# Patient Record
Sex: MALE | Race: BLACK OR AFRICAN AMERICAN | NOT HISPANIC OR LATINO | URBAN - METROPOLITAN AREA
[De-identification: names, ages, dates, MRNs, and addresses within clinical notes are randomized per-mention and may not be internally consistent; named-entity substitution may affect disease eponyms.]

---

## 2024-04-16 ENCOUNTER — INPATIENT (INPATIENT)
Facility: HOSPITAL | Age: 84
LOS: 9 days | Discharge: HOME HEALTH | DRG: 539 | End: 2024-04-26
Attending: INTERNAL MEDICINE | Admitting: HOSPITALIST
Payer: MEDICARE

## 2024-04-16 VITALS
SYSTOLIC BLOOD PRESSURE: 140 MMHG | RESPIRATION RATE: 20 BRPM | HEIGHT: 75 IN | TEMPERATURE: 98 F | DIASTOLIC BLOOD PRESSURE: 91 MMHG | OXYGEN SATURATION: 98 % | WEIGHT: 205.03 LBS | HEART RATE: 100 BPM

## 2024-04-16 DIAGNOSIS — I89.0 LYMPHEDEMA, NOT ELSEWHERE CLASSIFIED: ICD-10-CM

## 2024-04-16 DIAGNOSIS — S91.309A UNSPECIFIED OPEN WOUND, UNSPECIFIED FOOT, INITIAL ENCOUNTER: ICD-10-CM

## 2024-04-16 DIAGNOSIS — Z29.9 ENCOUNTER FOR PROPHYLACTIC MEASURES, UNSPECIFIED: ICD-10-CM

## 2024-04-16 DIAGNOSIS — I10 ESSENTIAL (PRIMARY) HYPERTENSION: ICD-10-CM

## 2024-04-16 LAB
ALBUMIN SERPL ELPH-MCNC: 3.2 G/DL — LOW (ref 3.3–5)
ALP SERPL-CCNC: 75 U/L — SIGNIFICANT CHANGE UP (ref 40–120)
ALT FLD-CCNC: 10 U/L — SIGNIFICANT CHANGE UP (ref 10–45)
ANION GAP SERPL CALC-SCNC: 9 MMOL/L — SIGNIFICANT CHANGE UP (ref 5–17)
APTT BLD: 34 SEC — SIGNIFICANT CHANGE UP (ref 24.5–35.6)
AST SERPL-CCNC: 36 U/L — SIGNIFICANT CHANGE UP (ref 10–40)
BASE EXCESS BLDV CALC-SCNC: 2.2 MMOL/L — SIGNIFICANT CHANGE UP (ref -2–3)
BASOPHILS # BLD AUTO: 0.03 K/UL — SIGNIFICANT CHANGE UP (ref 0–0.2)
BASOPHILS NFR BLD AUTO: 0.5 % — SIGNIFICANT CHANGE UP (ref 0–2)
BILIRUB SERPL-MCNC: 1 MG/DL — SIGNIFICANT CHANGE UP (ref 0.2–1.2)
BUN SERPL-MCNC: 9 MG/DL — SIGNIFICANT CHANGE UP (ref 7–23)
CA-I SERPL-SCNC: 1.27 MMOL/L — SIGNIFICANT CHANGE UP (ref 1.15–1.33)
CALCIUM SERPL-MCNC: 9.6 MG/DL — SIGNIFICANT CHANGE UP (ref 8.4–10.5)
CHLORIDE BLDV-SCNC: 101 MMOL/L — SIGNIFICANT CHANGE UP (ref 96–108)
CHLORIDE SERPL-SCNC: 100 MMOL/L — SIGNIFICANT CHANGE UP (ref 96–108)
CO2 BLDV-SCNC: 31 MMOL/L — HIGH (ref 22–26)
CO2 SERPL-SCNC: 24 MMOL/L — SIGNIFICANT CHANGE UP (ref 22–31)
CREAT SERPL-MCNC: 0.32 MG/DL — LOW (ref 0.5–1.3)
EGFR: 117 ML/MIN/1.73M2 — SIGNIFICANT CHANGE UP
EOSINOPHIL # BLD AUTO: 0.33 K/UL — SIGNIFICANT CHANGE UP (ref 0–0.5)
EOSINOPHIL NFR BLD AUTO: 5.1 % — SIGNIFICANT CHANGE UP (ref 0–6)
GAS PNL BLDV: 134 MMOL/L — LOW (ref 136–145)
GAS PNL BLDV: SIGNIFICANT CHANGE UP
GLUCOSE BLDV-MCNC: 98 MG/DL — SIGNIFICANT CHANGE UP (ref 70–99)
GLUCOSE SERPL-MCNC: 100 MG/DL — HIGH (ref 70–99)
HCO3 BLDV-SCNC: 29 MMOL/L — SIGNIFICANT CHANGE UP (ref 22–29)
HCT VFR BLD CALC: 29.4 % — LOW (ref 39–50)
HCT VFR BLDA CALC: 32 % — LOW (ref 39–51)
HGB BLD CALC-MCNC: 10.6 G/DL — LOW (ref 12.6–17.4)
HGB BLD-MCNC: 9.8 G/DL — LOW (ref 13–17)
IMM GRANULOCYTES NFR BLD AUTO: 0.3 % — SIGNIFICANT CHANGE UP (ref 0–0.9)
INR BLD: 1.21 RATIO — HIGH (ref 0.85–1.18)
LACTATE BLDV-MCNC: 2.2 MMOL/L — HIGH (ref 0.5–2)
LYMPHOCYTES # BLD AUTO: 0.87 K/UL — LOW (ref 1–3.3)
LYMPHOCYTES # BLD AUTO: 13.3 % — SIGNIFICANT CHANGE UP (ref 13–44)
MCHC RBC-ENTMCNC: 31.5 PG — SIGNIFICANT CHANGE UP (ref 27–34)
MCHC RBC-ENTMCNC: 33.3 GM/DL — SIGNIFICANT CHANGE UP (ref 32–36)
MCV RBC AUTO: 94.5 FL — SIGNIFICANT CHANGE UP (ref 80–100)
MONOCYTES # BLD AUTO: 0.44 K/UL — SIGNIFICANT CHANGE UP (ref 0–0.9)
MONOCYTES NFR BLD AUTO: 6.7 % — SIGNIFICANT CHANGE UP (ref 2–14)
NEUTROPHILS # BLD AUTO: 4.83 K/UL — SIGNIFICANT CHANGE UP (ref 1.8–7.4)
NEUTROPHILS NFR BLD AUTO: 74.1 % — SIGNIFICANT CHANGE UP (ref 43–77)
NRBC # BLD: 0 /100 WBCS — SIGNIFICANT CHANGE UP (ref 0–0)
NT-PROBNP SERPL-SCNC: 308 PG/ML — HIGH (ref 0–300)
PCO2 BLDV: 55 MMHG — SIGNIFICANT CHANGE UP (ref 42–55)
PH BLDV: 7.33 — SIGNIFICANT CHANGE UP (ref 7.32–7.43)
PLATELET # BLD AUTO: 268 K/UL — SIGNIFICANT CHANGE UP (ref 150–400)
PO2 BLDV: 24 MMHG — LOW (ref 25–45)
POTASSIUM BLDV-SCNC: 4.3 MMOL/L — SIGNIFICANT CHANGE UP (ref 3.5–5.1)
POTASSIUM SERPL-MCNC: 4.7 MMOL/L — SIGNIFICANT CHANGE UP (ref 3.5–5.3)
POTASSIUM SERPL-SCNC: 4.7 MMOL/L — SIGNIFICANT CHANGE UP (ref 3.5–5.3)
PROT SERPL-MCNC: 8.7 G/DL — HIGH (ref 6–8.3)
PROTHROM AB SERPL-ACNC: 12.6 SEC — SIGNIFICANT CHANGE UP (ref 9.5–13)
RBC # BLD: 3.11 M/UL — LOW (ref 4.2–5.8)
RBC # FLD: 19.9 % — HIGH (ref 10.3–14.5)
SAO2 % BLDV: 30.3 % — LOW (ref 67–88)
SODIUM SERPL-SCNC: 133 MMOL/L — LOW (ref 135–145)
TROPONIN T, HIGH SENSITIVITY RESULT: 34 NG/L — SIGNIFICANT CHANGE UP (ref 0–51)
TROPONIN T, HIGH SENSITIVITY RESULT: 37 NG/L — SIGNIFICANT CHANGE UP (ref 0–51)
WBC # BLD: 6.52 K/UL — SIGNIFICANT CHANGE UP (ref 3.8–10.5)
WBC # FLD AUTO: 6.52 K/UL — SIGNIFICANT CHANGE UP (ref 3.8–10.5)

## 2024-04-16 PROCEDURE — 73610 X-RAY EXAM OF ANKLE: CPT | Mod: 26,RT

## 2024-04-16 PROCEDURE — 73630 X-RAY EXAM OF FOOT: CPT | Mod: 26,LT

## 2024-04-16 PROCEDURE — 99285 EMERGENCY DEPT VISIT HI MDM: CPT

## 2024-04-16 PROCEDURE — 99223 1ST HOSP IP/OBS HIGH 75: CPT

## 2024-04-16 PROCEDURE — 73620 X-RAY EXAM OF FOOT: CPT | Mod: 26,RT

## 2024-04-16 RX ORDER — PIPERACILLIN AND TAZOBACTAM 4; .5 G/20ML; G/20ML
3.38 INJECTION, POWDER, LYOPHILIZED, FOR SOLUTION INTRAVENOUS ONCE
Refills: 0 | Status: COMPLETED | OUTPATIENT
Start: 2024-04-16 | End: 2024-04-16

## 2024-04-16 RX ORDER — LANOLIN ALCOHOL/MO/W.PET/CERES
3 CREAM (GRAM) TOPICAL AT BEDTIME
Refills: 0 | Status: DISCONTINUED | OUTPATIENT
Start: 2024-04-16 | End: 2024-04-26

## 2024-04-16 RX ORDER — ACETAMINOPHEN 500 MG
650 TABLET ORAL EVERY 6 HOURS
Refills: 0 | Status: DISCONTINUED | OUTPATIENT
Start: 2024-04-16 | End: 2024-04-26

## 2024-04-16 RX ORDER — VANCOMYCIN HCL 1 G
1250 VIAL (EA) INTRAVENOUS EVERY 12 HOURS
Refills: 0 | Status: DISCONTINUED | OUTPATIENT
Start: 2024-04-16 | End: 2024-04-17

## 2024-04-16 RX ORDER — VANCOMYCIN HCL 1 G
1000 VIAL (EA) INTRAVENOUS ONCE
Refills: 0 | Status: COMPLETED | OUTPATIENT
Start: 2024-04-16 | End: 2024-04-16

## 2024-04-16 RX ORDER — MORPHINE SULFATE 50 MG/1
4 CAPSULE, EXTENDED RELEASE ORAL ONCE
Refills: 0 | Status: DISCONTINUED | OUTPATIENT
Start: 2024-04-16 | End: 2024-04-16

## 2024-04-16 RX ORDER — ONDANSETRON 8 MG/1
4 TABLET, FILM COATED ORAL EVERY 8 HOURS
Refills: 0 | Status: DISCONTINUED | OUTPATIENT
Start: 2024-04-16 | End: 2024-04-26

## 2024-04-16 RX ORDER — PIPERACILLIN AND TAZOBACTAM 4; .5 G/20ML; G/20ML
3.38 INJECTION, POWDER, LYOPHILIZED, FOR SOLUTION INTRAVENOUS EVERY 8 HOURS
Refills: 0 | Status: DISCONTINUED | OUTPATIENT
Start: 2024-04-16 | End: 2024-04-17

## 2024-04-16 RX ORDER — SODIUM HYPOCHLORITE 0.125 %
1 SOLUTION, NON-ORAL MISCELLANEOUS ONCE
Refills: 0 | Status: COMPLETED | OUTPATIENT
Start: 2024-04-16 | End: 2024-04-16

## 2024-04-16 RX ORDER — SODIUM CHLORIDE 9 MG/ML
1000 INJECTION INTRAMUSCULAR; INTRAVENOUS; SUBCUTANEOUS ONCE
Refills: 0 | Status: COMPLETED | OUTPATIENT
Start: 2024-04-16 | End: 2024-04-16

## 2024-04-16 RX ORDER — HEPARIN SODIUM 5000 [USP'U]/ML
5000 INJECTION INTRAVENOUS; SUBCUTANEOUS EVERY 12 HOURS
Refills: 0 | Status: DISCONTINUED | OUTPATIENT
Start: 2024-04-16 | End: 2024-04-26

## 2024-04-16 RX ADMIN — PIPERACILLIN AND TAZOBACTAM 200 GRAM(S): 4; .5 INJECTION, POWDER, LYOPHILIZED, FOR SOLUTION INTRAVENOUS at 18:02

## 2024-04-16 RX ADMIN — SODIUM CHLORIDE 1000 MILLILITER(S): 9 INJECTION INTRAMUSCULAR; INTRAVENOUS; SUBCUTANEOUS at 18:02

## 2024-04-16 RX ADMIN — Medication 250 MILLIGRAM(S): at 18:28

## 2024-04-16 RX ADMIN — PIPERACILLIN AND TAZOBACTAM 3.38 GRAM(S): 4; .5 INJECTION, POWDER, LYOPHILIZED, FOR SOLUTION INTRAVENOUS at 19:03

## 2024-04-16 RX ADMIN — Medication 1 APPLICATION(S): at 19:00

## 2024-04-16 RX ADMIN — MORPHINE SULFATE 4 MILLIGRAM(S): 50 CAPSULE, EXTENDED RELEASE ORAL at 18:02

## 2024-04-16 NOTE — H&P ADULT - NSHPLABSRESULTS_GEN_ALL_CORE
9.8    6.52  )-----------( 268      ( 16 Apr 2024 17:57 )             29.4       04-16    133<L>  |  100  |  9   ----------------------------<  100<H>  4.7   |  24  |  0.32<L>    Ca    9.6      16 Apr 2024 17:57    TPro  8.7<H>  /  Alb  3.2<L>  /  TBili  1.0  /  DBili  x   /  AST  36  /  ALT  10  /  AlkPhos  75  04-16      Troponin T, High Sensitivity Result: 34: ng/L (04.16.24 @ 17:57)    Pro-Brain Natriuretic Peptide: 308 pg/mL (04.16.24 @ 17:57)    Lactate, Blood: 2.6 mmol/L (04.16.24 @ 20:54)    C-Reactive Protein, Serum: 36 mg/L (04.16.24 @ 20:09)      - - - - - - - - - - - - - - - - - - - - - - - - - - - - - - - - - - - - - - - - - - - - - - - - - - - -       EKG PERSONALLY REVIEWED:  sinus rhythm 90bpm + PVC     IMAGES PERSONALLY REVIEWED:     < from: Xray Foot AP + Lateral, Right (04.16.24 @ 19:41) >    IMPRESSION:  There is no fracture or dislocation. 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 Age-indeterminate erosion of the fifth metatarsal base,   for which correlation for acute osteomyelitis recommended. No visualized   soft tissue gas.

## 2024-04-16 NOTE — ED ADULT NURSE NOTE - OBJECTIVE STATEMENT
PT is an 83 year old A&OX4 male with PMH of HTN and LE lymphedema who presents to the ED via EMS with c/o chronic foot wounds. Per EMS, PT was sent in by his home wound care MD for weeping and bleeding of the foot wounds. PT states he has had 1 week of worsening pain with bleeding through the right sole of his foot. PT denies chest pain, SOB, N/V/D, dizziness, fevers, chills. PT's aide at the bedside, states she only recently started working with PT and is unable to provide any other information. PT lives at home, is wheelchair bound, and has full time aides to help with home care. PT is resting comfortably in bed, breathing unlabored on room air, and speaking in complete sentences. Abdomen is soft, non-tender, and non-distended. Skin is warm and dry, no diaphoresis noted. No edema noted to B/L extremities. Lower extremities contracted. Strong strength in upper extremities. IV access established 20G in right AC. PT placed in hospital gown. Safety and comfort maintained. Aide at the bedside.

## 2024-04-16 NOTE — ED PROVIDER NOTE - ATTENDING CONTRIBUTION TO CARE
I, Se Abbott, have performed a history and physical exam on this patient, and discussed their management with the resident. I have fully participated in the care of this patient. I agree with the history, physical exam, and plan as documented by the resident

## 2024-04-16 NOTE — H&P ADULT - PROBLEM SELECTOR PLAN 4
- Denies coagulopathy  - No prior reaction to anesthesia  - Non smoker   -  Unable to perform > 4mets (w/c bound, has HHA)   - RCRI: 0  - EKG sinus   - cbc mild anemia & cmp largely unremarkable    - patient is Mod risk for low risk procedure,  pt is medically optimized provided   labs remain stable w/no clinical decompensation

## 2024-04-16 NOTE — H&P ADULT - PROBLEM SELECTOR PLAN 3
- DVT ppx: Heparin SQ   - Diet: DASH/ CC  - PT consult - Stable   - C/w metoprolol  - Hold lasix give mild hypoNa  - Trend labs

## 2024-04-16 NOTE — H&P ADULT - ASSESSMENT
83y M pmh htn, chronic LE lymphedema presents for eval of foot wound. a/f further eval/ tx of leg wound

## 2024-04-16 NOTE — H&P ADULT - HISTORY OF PRESENT ILLNESS
83y M pmh htn, chronic LE lymphedema presents for eval of foot wound.  He states for one week he has had worsening pain with bleeding throughout the sole of the right foot. Was seen by wound care Podiatrist who sent pt to ED for further eval/ tx of leg wound. Pt is wheelchair bound, lives at home, has full time aides to help him.     ROS: Denies CP, SOB, palpitation, N/V/D, fever, cough, chills, dizziness, abm pain, recent travel, sick contact, change in bowel and urinary habits     A 10-system ROS was performed and is negative except as noted above and/or in the HPI.

## 2024-04-16 NOTE — H&P ADULT - PROBLEM SELECTOR PLAN 2
- Stable       anemia Hg  9.8  (bL unclear, no prior in chart )   - Ddx: AoCD vs NORAH   - Check iron studies, b12, folate

## 2024-04-16 NOTE — ED ADULT NURSE REASSESSMENT NOTE - NS ED NURSE REASSESS COMMENT FT1
RN received rpt on pt from day shift RN. Pt A&Ox3, breathing unlabored and spontaneous. Pt requesting food, RN spoke with ED MD, ED MD states pt ok to eat. Pt provided by food. Pt resting in stretcher, bed in lowest position, aide at bedside. Comfort and safety measures maintained.

## 2024-04-16 NOTE — ED PROVIDER NOTE - OBJECTIVE STATEMENT
84 y/o male hx 82 y/o male hx htn, chronic LE lymphedema presents for foot wound. He was sent in by wound care Podiatrist to be seen by in hospital podiatry. He states for one week he has had worsening pain with bleeding throughout the sole of the right foot. He denies chest pain, SOB, fever/chills, n/v/d, dysuria/hematuria. He lives at home, is wheelchair bound, has full time aides to help with home care.

## 2024-04-16 NOTE — CONSULT NOTE ADULT - SUBJECTIVE AND OBJECTIVE BOX
Patient is a 83y old  Male who presents with a chief complaint of bilateral lower extremity wounds    HPI: 82 y/o male hx htn, chronic LE lymphedema presents for foot wound. He was sent in by wound care Podiatrist to be seen by in hospital podiatry. He states for one week he has had worsening pain with bleeding throughout the sole of the right foot. He denies chest pain, SOB, fever/chills, n/v/d, dysuria/hematuria. He lives at home, is wheelchair bound, has full time aides to help with home care.      PAST MEDICAL & SURGICAL HISTORY:      MEDICATIONS  (STANDING):    MEDICATIONS  (PRN):      Allergies    No Known Allergies    Intolerances        VITALS:    Vital Signs Last 24 Hrs  T(C): 36.7 (16 Apr 2024 17:50), Max: 36.7 (16 Apr 2024 17:50)  T(F): 98 (16 Apr 2024 17:50), Max: 98 (16 Apr 2024 17:50)  HR: 105 (16 Apr 2024 17:50) (100 - 105)  BP: 134/71 (16 Apr 2024 17:50) (134/71 - 140/91)  BP(mean): 98 (16 Apr 2024 17:50) (98 - 98)  RR: 20 (16 Apr 2024 17:50) (20 - 20)  SpO2: 100% (16 Apr 2024 17:50) (98% - 100%)    Parameters below as of 16 Apr 2024 17:50  Patient On (Oxygen Delivery Method): room air        LABS:                          9.8    6.52  )-----------( 268      ( 16 Apr 2024 17:57 )             29.4       04-16    133<L>  |  100  |  9   ----------------------------<  100<H>  4.7   |  24  |  0.32<L>    Ca    9.6      16 Apr 2024 17:57    TPro  8.7<H>  /  Alb  3.2<L>  /  TBili  1.0  /  DBili  x   /  AST  36  /  ALT  10  /  AlkPhos  75  04-16      CAPILLARY BLOOD GLUCOSE          PT/INR - ( 16 Apr 2024 18:17 )   PT: 12.6 sec;   INR: 1.21 ratio         PTT - ( 16 Apr 2024 18:17 )  PTT:34.0 sec    LOWER EXTREMITY PHYSICAL EXAM:    Vascular: DP/PT 0/4 2/2 to edema B/L, CFT <4 seconds B/L, Temperature gradient warm to cool, B/L.   Neuro: Epicritic sensation intact to the level of toes, B/L.  Musculoskeletal/Ortho: contracted  Skin: severe bilateral lower extremity lymphedema with scaly skin dorsally and plantarly. Bilateral plantar heel wound graft noted stapled to wound bed and skin, mid serosanguinous drainage, no purulence, moderate malodor, no erythema.    RADIOLOGY & ADDITIONAL STUDIES:    < from: Xray Foot AP + Lateral, Right (04.16.24 @ 19:41) >    ACC: [id number] EXAM:  XR ANKLE COMP MIN 3 VIEWS RT   ORDERED BY:  KATHERIN RODARTE     ACC: [id number] EXAM:  XR FOOT 2 VIEWS RT   ORDERED BY:  KATHERIN RODARTE     PROCEDURE DATE:  04/16/2024          INTERPRETATION:  INDICATION: Right foot/ankle infection    TECHNIQUE: 2 views of the right foot and 2 views of the right ankle    COMPARISON: None available    FINDINGS/  IMPRESSION:    There is no fracture or dislocation. 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 Age-indeterminate erosion of the fifth metatarsal base,   for which correlation for acute osteomyelitis recommended. No visualized   soft tissue gas.    --- End of Report ---            NEGRITA PERRY MBA-THERESE PEREZ; Attending Radiologist  This document has been electronically signed. Apr 16 2024  7:47PM    < end of copied text >   Patient is a 83y old  Male who presents with a chief complaint of bilateral lower extremity wounds    HPI: 84 y/o male hx htn, chronic LE lymphedema presents for foot wound. He was sent in by wound care Podiatrist to be seen by in hospital podiatry. He states for one week he has had worsening pain with bleeding throughout the sole of the right foot. He denies chest pain, SOB, fever/chills, n/v/d, dysuria/hematuria. He lives at home, is wheelchair bound, has full time aides to help with home care.      PAST MEDICAL & SURGICAL HISTORY:      MEDICATIONS  (STANDING):    MEDICATIONS  (PRN):      Allergies    No Known Allergies    Intolerances        VITALS:    Vital Signs Last 24 Hrs  T(C): 36.7 (16 Apr 2024 17:50), Max: 36.7 (16 Apr 2024 17:50)  T(F): 98 (16 Apr 2024 17:50), Max: 98 (16 Apr 2024 17:50)  HR: 105 (16 Apr 2024 17:50) (100 - 105)  BP: 134/71 (16 Apr 2024 17:50) (134/71 - 140/91)  BP(mean): 98 (16 Apr 2024 17:50) (98 - 98)  RR: 20 (16 Apr 2024 17:50) (20 - 20)  SpO2: 100% (16 Apr 2024 17:50) (98% - 100%)    Parameters below as of 16 Apr 2024 17:50  Patient On (Oxygen Delivery Method): room air        LABS:                          9.8    6.52  )-----------( 268      ( 16 Apr 2024 17:57 )             29.4       04-16    133<L>  |  100  |  9   ----------------------------<  100<H>  4.7   |  24  |  0.32<L>    Ca    9.6      16 Apr 2024 17:57    TPro  8.7<H>  /  Alb  3.2<L>  /  TBili  1.0  /  DBili  x   /  AST  36  /  ALT  10  /  AlkPhos  75  04-16      CAPILLARY BLOOD GLUCOSE          PT/INR - ( 16 Apr 2024 18:17 )   PT: 12.6 sec;   INR: 1.21 ratio         PTT - ( 16 Apr 2024 18:17 )  PTT:34.0 sec    LOWER EXTREMITY PHYSICAL EXAM:    Vascular: DP/PT 0/4 2/2 to edema B/L, CFT <4 seconds B/L, Temperature gradient warm to cool, B/L.   Neuro: Epicritic sensation intact to the level of toes, B/L.  Musculoskeletal/Ortho: contracted  Skin: severe bilateral lower extremity lymphedema with scaly skin dorsally and plantarly. Bilateral plantar heel wound graft and staples intact, mild serosanguinous drainage, no purulence, moderate malodor, no erythema.    RADIOLOGY & ADDITIONAL STUDIES:    < from: Xray Foot AP + Lateral, Right (04.16.24 @ 19:41) >    ACC: [id number] EXAM:  XR ANKLE COMP MIN 3 VIEWS RT   ORDERED BY:  KATHERIN RODARTE     ACC: [id number] EXAM:  XR FOOT 2 VIEWS RT   ORDERED BY:  KATHERIN RODARTE     PROCEDURE DATE:  04/16/2024          INTERPRETATION:  INDICATION: Right foot/ankle infection    TECHNIQUE: 2 views of the right foot and 2 views of the right ankle    COMPARISON: None available    FINDINGS/  IMPRESSION:    There is no fracture or dislocation. 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 Age-indeterminate erosion of the fifth metatarsal base,   for which correlation for acute osteomyelitis recommended. No visualized   soft tissue gas.    --- End of Report ---            NEGRITA PERRY MBA-THERESE PEREZ; Attending Radiologist  This document has been electronically signed. Apr 16 2024  7:47PM    < end of copied text >

## 2024-04-16 NOTE — H&P ADULT - NSHPPHYSICALEXAM_GEN_ALL_CORE
T(C): 36.2 (04-16-24 @ 23:35), Max: 36.7 (04-16-24 @ 17:50)  HR: 77 (04-16-24 @ 23:35) (77 - 105)  BP: 145/85 (04-16-24 @ 23:35) (134/71 - 145/85)  RR: 20 (04-16-24 @ 23:35) (20 - 20)  SpO2: 97% (04-16-24 @ 23:35) (97% - 100%)    CONSTITUTIONAL: Well groomed, no apparent distress  EYES: PERRLA , EOMI  ENMT: MMM. Normal dentition  RESP: No respiratory distress, CTA b/l  CV: +S1S2, no MRG; 3+ b/L LE edema iso lymphedema   GI: Soft, NTND, no RGR  MSK: Normal ROM x4 extremities   SKIN: dry scaly, swollen b/l LE lymphedema   NEURO: CN II-XII grossly intact; normal reflexes  PSYCH: A+O x 3

## 2024-04-16 NOTE — ED PROVIDER NOTE - PHYSICAL EXAMINATION
General: Alert and Orientated x 3. No apparent distress.  Head: Normocephalic and atraumatic.  Eyes: PERRLA with EOMI.   ENT: MMM  Neck: Supple.   Cardiac: Normal S1 and S2 w/ RRR. No murmurs appreciated.   Pulmonary: CTA bilaterally. No increased WOB.   Abdominal: Soft, non-tender, non-distended  Neurologic: No focal sensory or motor deficits.  Musculoskeletal: Severe bilateral lower extremity lymphedema, R foot with open ulceration w/ slight bleeding. No pus/discharge  Skin: Color appropriate for race. Intact, warm, and well-perfused.  Psychiatric: Appropriate mood and affect. No apparent risk to self or others.

## 2024-04-16 NOTE — ED ADULT NURSE NOTE - CHIEF COMPLAINT QUOTE
Detail Level: Detailed Add [id number] Cpt? (Important Note: In 2017 The Use Of 67018 Is Being Tracked By Cms To Determine Future Global Period Reimbursement For Global Periods): yes b/l chronic leg wounds  Denies fever

## 2024-04-16 NOTE — ED PROVIDER NOTE - CLINICAL SUMMARY MEDICAL DECISION MAKING FREE TEXT BOX
82 y/o male presents w/ severe bilateral lower extremity lymphedema, R foot with open ulceration w/ slight bleeding. No pus/discharge. He is hemodynamically stable, afebrile. Podiatry aware of patient, will get labs, x-ray assess for osteo/gas forming infection, give pain control + abx.

## 2024-04-16 NOTE — H&P ADULT - PROBLEM SELECTOR PLAN 1
- P/w  worsening pain with bleeding throughout the sole of the right foot  x 1wk   - Afebrile, VSS, clinically nontoxic   - Labs: Trop 34, , CRP 36 (elevated)   - Rt foot XR: no fracture or dislocation. Age-indeterminate erosion of the fifth metatarsal base  - Eval by Podiatry, foot wound cleansed and dressed by Podiatry   - IV abx  - F/u Bcx , Ucx, Abscess Gm stain , ESR   - F/u XR left foot   - SUKH/PVR ordered   - Vascular consult (ordered) f/u in AM   - Podiatry: plan local wound care vs bilateral wounds debridement pending imaging /vasc recs

## 2024-04-16 NOTE — CONSULT NOTE ADULT - ASSESSMENT
83M presents w BL LE lymphedema  - Patient seen and evaluated  - Afebrile, WBC 6.52, ESR/CRP pending  - severe bilateral lower extremity lymphedema with scaly skin dorsally and plantarly. Bilateral  boggy plantar heel wound graft noted stapled to wound bed and skin, mid serosanguinous drainage, no purulence, moderate malodor, no erythema.  - aseptic removal of bilateral of bilateral heel graft using a staple remover revealing necrotic wound bed with exposed calcaneal bone  -Flushed with copious amounts of saline, dressed with xeroform followed by 4x4 gauze and naomi  - Right foot wound obtained and ordered  - recommending continue with IV abx  - right foot xrays: Age-indeterminate erosion of the fifth metatarsal base, no gas  - ordered left foot xrays  - ordered SUKH/PVR- recommending vasc consult  - pod plan local wound care pending vasc recs  - ordered dakins solution  - Discussed with attending    83M presents w BL LE lymphedema  - Patient seen and evaluated  - Afebrile, WBC 6.52, ESR/CRP pending  - severe bilateral lower extremity lymphedema with scaly skin dorsally and plantarly. Bilateral  boggy plantar heel wound graft noted stapled to wound bed and skin, mid serosanguinous drainage, no purulence, moderate malodor, no erythema.  - aseptic removal of bilateral of bilateral heel graft using a staple remover revealing necrotic wound bed with exposed calcaneal bone  -Flushed with copious amounts of saline, dressed with xeroform followed by 4x4 gauze and naomi  - Right foot wound obtained and ordered  - recommending continue with IV abx  - Pt already admitted to medicine  - right foot xrays: Age-indeterminate erosion of the fifth metatarsal base, no gas  - ordered left foot xrays  - ordered SUKH/PVR- recommending vasc consult  - pod plan local wound care pending vasc recs  - ordered dakins solution  - Discussed with attending    83M presents w BL LE lymphedema  - Patient seen and evaluated  - Afebrile, WBC 6.52, ESR/CRP pending  - severe bilateral lower extremity lymphedema with scaly skin dorsally and plantarly. Bilateral plantar heel wound graft and staples intact, mild serosanguinous drainage, no purulence, moderate malodor, no erythema.  - aseptic removal of bilateral of bilateral heel graft and staples using a staple remover revealing necrotic wound bed with exposed calcaneal bone  -Flushed with copious amounts of saline, dressed with xeroform followed by 4x4 gauze and naomi  - Right foot wound obtained and ordered  - recommending continue with IV abx  - Pt already admitted to medicine  - right foot xrays: Age-indeterminate erosion of the fifth metatarsal base, no gas  - ordered left foot xrays  - ordered SUKH/PVR- recommending vasc consult  - ordered dakins solution  - Pod plan local wound care vs bilateral wounds debridement pending imaging /vasc recs  - Please document medical clearance for possible podiatry procedure under anesthesia  - Discussed with attending

## 2024-04-16 NOTE — ED ADULT NURSE NOTE - NSFALLRISKINTERV_ED_ALL_ED

## 2024-04-17 DIAGNOSIS — Z01.818 ENCOUNTER FOR OTHER PREPROCEDURAL EXAMINATION: ICD-10-CM

## 2024-04-17 DIAGNOSIS — Z79.899 OTHER LONG TERM (CURRENT) DRUG THERAPY: ICD-10-CM

## 2024-04-17 DIAGNOSIS — D64.9 ANEMIA, UNSPECIFIED: ICD-10-CM

## 2024-04-17 LAB
ADD ON TEST-SPECIMEN IN LAB: SIGNIFICANT CHANGE UP
ANION GAP SERPL CALC-SCNC: 6 MMOL/L — SIGNIFICANT CHANGE UP (ref 5–17)
BUN SERPL-MCNC: 7 MG/DL — SIGNIFICANT CHANGE UP (ref 7–23)
CALCIUM SERPL-MCNC: 8.5 MG/DL — SIGNIFICANT CHANGE UP (ref 8.4–10.5)
CHLORIDE SERPL-SCNC: 103 MMOL/L — SIGNIFICANT CHANGE UP (ref 96–108)
CO2 SERPL-SCNC: 24 MMOL/L — SIGNIFICANT CHANGE UP (ref 22–31)
CREAT SERPL-MCNC: 0.34 MG/DL — LOW (ref 0.5–1.3)
EGFR: 115 ML/MIN/1.73M2 — SIGNIFICANT CHANGE UP
FERRITIN SERPL-MCNC: 209 NG/ML — SIGNIFICANT CHANGE UP (ref 30–400)
FOLATE SERPL-MCNC: 8 NG/ML — SIGNIFICANT CHANGE UP
GLUCOSE SERPL-MCNC: 108 MG/DL — HIGH (ref 70–99)
GRAM STN FLD: ABNORMAL
HCT VFR BLD CALC: 24 % — LOW (ref 39–50)
HGB BLD-MCNC: 7.8 G/DL — LOW (ref 13–17)
INR BLD: 1.25 RATIO — HIGH (ref 0.85–1.18)
IRON SATN MFR SERPL: 14 % — LOW (ref 16–55)
IRON SATN MFR SERPL: 24 UG/DL — LOW (ref 45–165)
MAGNESIUM SERPL-MCNC: 1.5 MG/DL — LOW (ref 1.6–2.6)
MCHC RBC-ENTMCNC: 30.5 PG — SIGNIFICANT CHANGE UP (ref 27–34)
MCHC RBC-ENTMCNC: 32.5 GM/DL — SIGNIFICANT CHANGE UP (ref 32–36)
MCV RBC AUTO: 93.8 FL — SIGNIFICANT CHANGE UP (ref 80–100)
NRBC # BLD: 0 /100 WBCS — SIGNIFICANT CHANGE UP (ref 0–0)
PHOSPHATE SERPL-MCNC: 2.4 MG/DL — LOW (ref 2.5–4.5)
PLATELET # BLD AUTO: 215 K/UL — SIGNIFICANT CHANGE UP (ref 150–400)
POTASSIUM SERPL-MCNC: 2.9 MMOL/L — CRITICAL LOW (ref 3.5–5.3)
POTASSIUM SERPL-SCNC: 2.9 MMOL/L — CRITICAL LOW (ref 3.5–5.3)
PROTHROM AB SERPL-ACNC: 13 SEC — SIGNIFICANT CHANGE UP (ref 9.5–13)
RBC # BLD: 2.56 M/UL — LOW (ref 4.2–5.8)
RBC # FLD: 19.3 % — HIGH (ref 10.3–14.5)
SODIUM SERPL-SCNC: 133 MMOL/L — LOW (ref 135–145)
SPECIMEN SOURCE: SIGNIFICANT CHANGE UP
TIBC SERPL-MCNC: 167 UG/DL — LOW (ref 220–430)
UIBC SERPL-MCNC: 144 UG/DL — SIGNIFICANT CHANGE UP (ref 110–370)
VIT B12 SERPL-MCNC: 603 PG/ML — SIGNIFICANT CHANGE UP (ref 232–1245)
WBC # BLD: 6.45 K/UL — SIGNIFICANT CHANGE UP (ref 3.8–10.5)
WBC # FLD AUTO: 6.45 K/UL — SIGNIFICANT CHANGE UP (ref 3.8–10.5)

## 2024-04-17 PROCEDURE — 93923 UPR/LXTR ART STDY 3+ LVLS: CPT | Mod: 26

## 2024-04-17 PROCEDURE — 99232 SBSQ HOSP IP/OBS MODERATE 35: CPT | Mod: GC

## 2024-04-17 RX ORDER — POTASSIUM CHLORIDE 20 MEQ
20 PACKET (EA) ORAL
Refills: 0 | Status: COMPLETED | OUTPATIENT
Start: 2024-04-17 | End: 2024-04-17

## 2024-04-17 RX ORDER — METOPROLOL TARTRATE 50 MG
12.5 TABLET ORAL
Refills: 0 | Status: DISCONTINUED | OUTPATIENT
Start: 2024-04-17 | End: 2024-04-26

## 2024-04-17 RX ORDER — GABAPENTIN 400 MG/1
300 CAPSULE ORAL THREE TIMES A DAY
Refills: 0 | Status: DISCONTINUED | OUTPATIENT
Start: 2024-04-17 | End: 2024-04-26

## 2024-04-17 RX ORDER — POTASSIUM CHLORIDE 20 MEQ
40 PACKET (EA) ORAL ONCE
Refills: 0 | Status: COMPLETED | OUTPATIENT
Start: 2024-04-17 | End: 2024-04-17

## 2024-04-17 RX ORDER — PANTOPRAZOLE SODIUM 20 MG/1
40 TABLET, DELAYED RELEASE ORAL
Refills: 0 | Status: DISCONTINUED | OUTPATIENT
Start: 2024-04-17 | End: 2024-04-26

## 2024-04-17 RX ORDER — CHLORHEXIDINE GLUCONATE 213 G/1000ML
1 SOLUTION TOPICAL DAILY
Refills: 0 | Status: DISCONTINUED | OUTPATIENT
Start: 2024-04-17 | End: 2024-04-26

## 2024-04-17 RX ORDER — SODIUM HYPOCHLORITE 0.125 %
1 SOLUTION, NON-ORAL MISCELLANEOUS DAILY
Refills: 0 | Status: DISCONTINUED | OUTPATIENT
Start: 2024-04-17 | End: 2024-04-26

## 2024-04-17 RX ORDER — POTASSIUM CHLORIDE 20 MEQ
10 PACKET (EA) ORAL
Refills: 0 | Status: COMPLETED | OUTPATIENT
Start: 2024-04-17 | End: 2024-04-17

## 2024-04-17 RX ADMIN — HEPARIN SODIUM 5000 UNIT(S): 5000 INJECTION INTRAVENOUS; SUBCUTANEOUS at 17:50

## 2024-04-17 RX ADMIN — Medication 40 MILLIEQUIVALENT(S): at 15:26

## 2024-04-17 RX ADMIN — Medication 20 MILLIEQUIVALENT(S): at 22:49

## 2024-04-17 RX ADMIN — PIPERACILLIN AND TAZOBACTAM 25 GRAM(S): 4; .5 INJECTION, POWDER, LYOPHILIZED, FOR SOLUTION INTRAVENOUS at 06:21

## 2024-04-17 RX ADMIN — PIPERACILLIN AND TAZOBACTAM 25 GRAM(S): 4; .5 INJECTION, POWDER, LYOPHILIZED, FOR SOLUTION INTRAVENOUS at 00:24

## 2024-04-17 RX ADMIN — Medication 650 MILLIGRAM(S): at 23:45

## 2024-04-17 RX ADMIN — Medication 650 MILLIGRAM(S): at 07:20

## 2024-04-17 RX ADMIN — GABAPENTIN 300 MILLIGRAM(S): 400 CAPSULE ORAL at 13:32

## 2024-04-17 RX ADMIN — GABAPENTIN 300 MILLIGRAM(S): 400 CAPSULE ORAL at 06:21

## 2024-04-17 RX ADMIN — Medication 650 MILLIGRAM(S): at 22:46

## 2024-04-17 RX ADMIN — HEPARIN SODIUM 5000 UNIT(S): 5000 INJECTION INTRAVENOUS; SUBCUTANEOUS at 06:21

## 2024-04-17 RX ADMIN — Medication 166.67 MILLIGRAM(S): at 06:21

## 2024-04-17 RX ADMIN — Medication 12.5 MILLIGRAM(S): at 06:21

## 2024-04-17 RX ADMIN — Medication 650 MILLIGRAM(S): at 15:58

## 2024-04-17 RX ADMIN — GABAPENTIN 300 MILLIGRAM(S): 400 CAPSULE ORAL at 22:50

## 2024-04-17 RX ADMIN — Medication 650 MILLIGRAM(S): at 01:20

## 2024-04-17 RX ADMIN — Medication 12.5 MILLIGRAM(S): at 17:49

## 2024-04-17 RX ADMIN — Medication 650 MILLIGRAM(S): at 06:20

## 2024-04-17 RX ADMIN — CHLORHEXIDINE GLUCONATE 1 APPLICATION(S): 213 SOLUTION TOPICAL at 15:27

## 2024-04-17 RX ADMIN — PANTOPRAZOLE SODIUM 40 MILLIGRAM(S): 20 TABLET, DELAYED RELEASE ORAL at 06:19

## 2024-04-17 RX ADMIN — Medication 20 MILLIEQUIVALENT(S): at 17:49

## 2024-04-17 RX ADMIN — Medication 650 MILLIGRAM(S): at 15:28

## 2024-04-17 RX ADMIN — Medication 650 MILLIGRAM(S): at 00:23

## 2024-04-17 NOTE — PHYSICAL THERAPY INITIAL EVALUATION ADULT - MANUAL MUSCLE TESTING RESULTS, REHAB EVAL
Pt is 4/5 in UE strength, B/L LE not tested 2/2 to pain in bilateral feet with movement -pt just given pain medication from RN

## 2024-04-17 NOTE — PROGRESS NOTE ADULT - SUBJECTIVE AND OBJECTIVE BOX
Podiatry pager #: [phone] ([place])/ [phone] ([facility])    Patient is a 83y old  Male who presents with a chief complaint of Wound Check (16 Apr 2024 21:46)       INTERVAL HPI/OVERNIGHT EVENTS:  Patient seen and evaluated at bedside.  Pt is resting comfortable in NAD. Denies N/V/F/C.     Allergies    No Known Allergies    Intolerances        Vital Signs Last 24 Hrs  T(C): 36.6 (17 Apr 2024 06:17), Max: 36.7 (16 Apr 2024 17:50)  T(F): 97.9 (17 Apr 2024 06:17), Max: 98 (16 Apr 2024 17:50)  HR: 99 (17 Apr 2024 06:17) (77 - 105)  BP: 111/61 (17 Apr 2024 06:17) (111/61 - 145/85)  BP(mean): 87 (16 Apr 2024 19:50) (87 - 98)  RR: 20 (17 Apr 2024 06:17) (20 - 20)  SpO2: 100% (17 Apr 2024 06:17) (97% - 100%)    Parameters below as of 17 Apr 2024 06:17  Patient On (Oxygen Delivery Method): room air        LABS:                        7.8    6.45  )-----------( 215      ( 17 Apr 2024 06:37 )             24.0     04-17    133<L>  |  103  |  7   ----------------------------<  108<H>  2.9<LL>   |  24  |  0.34<L>    Ca    8.5      17 Apr 2024 06:39  Phos  2.4     04-17  Mg     1.5     04-17    TPro  8.7<H>  /  Alb  3.2<L>  /  TBili  1.0  /  DBili  x   /  AST  36  /  ALT  10  /  AlkPhos  75  04-16    PT/INR - ( 17 Apr 2024 06:37 )   PT: 13.0 sec;   INR: 1.25 ratio         PTT - ( 16 Apr 2024 18:17 )  PTT:34.0 sec  Urinalysis Basic - ( 17 Apr 2024 06:39 )    Color: x / Appearance: x / SG: x / pH: x  Gluc: 108 mg/dL / Ketone: x  / Bili: x / Urobili: x   Blood: x / Protein: x / Nitrite: x   Leuk Esterase: x / RBC: x / WBC x   Sq Epi: x / Non Sq Epi: x / Bacteria: x      CAPILLARY BLOOD GLUCOSE          Lower Extremity Physical Exam:  Vascular: DP/PT 0/4 2/2 to edema B/L, CFT <4 seconds B/L, Temperature gradient warm to cool, B/L.   Neuro: Epicritic sensation intact to the level of toes, B/L.  Musculoskeletal/Ortho: contracted  Skin: severe bilateral lower extremity lymphedema with scaly skin dorsally and plantarly. right heel wound with exposed calcaneus measuring 8x 8 cm , with scant serosanguinous drainage, no purulence no erythema, left heel wound fibronecrotic wound bed probing to bed measuring 8x 5cm, scant serosanguinous drainage no purulence.  left wound 5th styloid wound measuring 2x2 cm with graft and staples intact.   RADIOLOGY & ADDITIONAL TESTS:   Podiatry pager #: [phone] ([place])/ [phone] ([facility])    Patient is a 83y old  Male who presents with a chief complaint of Wound Check (16 Apr 2024 21:46)       INTERVAL HPI/OVERNIGHT EVENTS:  Patient seen and evaluated at bedside.  Pt is resting comfortable in NAD. Denies N/V/F/C.     Allergies    No Known Allergies    Intolerances        Vital Signs Last 24 Hrs  T(C): 36.6 (17 Apr 2024 06:17), Max: 36.7 (16 Apr 2024 17:50)  T(F): 97.9 (17 Apr 2024 06:17), Max: 98 (16 Apr 2024 17:50)  HR: 99 (17 Apr 2024 06:17) (77 - 105)  BP: 111/61 (17 Apr 2024 06:17) (111/61 - 145/85)  BP(mean): 87 (16 Apr 2024 19:50) (87 - 98)  RR: 20 (17 Apr 2024 06:17) (20 - 20)  SpO2: 100% (17 Apr 2024 06:17) (97% - 100%)    Parameters below as of 17 Apr 2024 06:17  Patient On (Oxygen Delivery Method): room air        LABS:                        7.8    6.45  )-----------( 215      ( 17 Apr 2024 06:37 )             24.0     04-17    133<L>  |  103  |  7   ----------------------------<  108<H>  2.9<LL>   |  24  |  0.34<L>    Ca    8.5      17 Apr 2024 06:39  Phos  2.4     04-17  Mg     1.5     04-17    TPro  8.7<H>  /  Alb  3.2<L>  /  TBili  1.0  /  DBili  x   /  AST  36  /  ALT  10  /  AlkPhos  75  04-16    PT/INR - ( 17 Apr 2024 06:37 )   PT: 13.0 sec;   INR: 1.25 ratio         PTT - ( 16 Apr 2024 18:17 )  PTT:34.0 sec  Urinalysis Basic - ( 17 Apr 2024 06:39 )    Color: x / Appearance: x / SG: x / pH: x  Gluc: 108 mg/dL / Ketone: x  / Bili: x / Urobili: x   Blood: x / Protein: x / Nitrite: x   Leuk Esterase: x / RBC: x / WBC x   Sq Epi: x / Non Sq Epi: x / Bacteria: x      CAPILLARY BLOOD GLUCOSE          Lower Extremity Physical Exam:  Vascular: DP/PT 0/4 2/2 to edema B/L, CFT <4 seconds B/L, Temperature gradient warm to cool, B/L.   Neuro: Epicritic sensation intact to the level of toes, B/L.  Musculoskeletal/Ortho: contracted  Skin: severe bilateral lower extremity lymphedema with scaly skin dorsally and plantarly. right heel wound w exposed calcaneus, fibroganular woudn bed  measuring 8x 8 cm , scant serosanguinous drainage, no purulence, no erythema, left heel wound fibronecrotic wound bed probing to bone measuring 8x 5cm, scant serosanguinous drainage no purulence.  left wound 5th styloid wound measuring 2x2 cm with graft and staples kept intact, non boggy    RADIOLOGY & ADDITIONAL TESTS:

## 2024-04-17 NOTE — PATIENT PROFILE ADULT - FUNCTIONAL ASSESSMENT - BASIC MOBILITY 5.
Patient Intake Form      When did this problem first start? June 29 or 30  How did the problem start? Suddenly  What activities or positions increase your pain? Movement of right arm  What activities or positions decrease your pain?  nothing  What tests have been done for this issue?  x-rays  What treatments have been done for this issue? Have you had a prior injury to this area?   Yes, describe: 30 years ago Right rotator cuff tear  Dominate hand: right handed 1 = Total assistance

## 2024-04-17 NOTE — PATIENT PROFILE ADULT - HOW PATIENT ADDRESSED, PROFILE
Bentyl given at 2200 and 0330 for pain. IV fluids infusing. Patient is up with minimal stand-by assist. Independent with colostomy care. Patient appears to be sleeping well between cares.    Familia

## 2024-04-17 NOTE — PROGRESS NOTE ADULT - SUBJECTIVE AND OBJECTIVE BOX
PROGRESS NOTE:   Authored by Dr. Juan Manuel Craig MD ([id number]-1). Pager [facility] [phone] / [facility]     Patient is a 83y old  Male who presents with a chief complaint of Wound Check (17 Apr 2024 12:47)      SUBJECTIVE / OVERNIGHT EVENTS:  No acute events overnight.     ADDITIONAL REVIEW OF SYSTEMS:  Patient denies fevers, chills, chest pain, shortness of breath, nausea, abdominal pain, diarrhea, constipation, dysuria, leg swelling, headache, light headedness.    MEDICATIONS  (STANDING):  chlorhexidine 4% Liquid 1 Application(s) Topical daily  Dakins Solution - 1/2 Strength 1 Application(s) Topical daily  gabapentin 300 milliGRAM(s) Oral three times a day  heparin   Injectable 5000 Unit(s) SubCutaneous every 12 hours  metoprolol tartrate 12.5 milliGRAM(s) Oral two times a day  pantoprazole    Tablet 40 milliGRAM(s) Oral before breakfast  potassium chloride    Tablet ER 20 milliEquivalent(s) Oral every 2 hours  potassium chloride  10 mEq/100 mL IVPB 10 milliEquivalent(s) IV Intermittent every 1 hour    MEDICATIONS  (PRN):  acetaminophen     Tablet .. 650 milliGRAM(s) Oral every 6 hours PRN Temp greater or equal to 38C (100.4F), Mild Pain (1 - 3)  aluminum hydroxide/magnesium hydroxide/simethicone Suspension 30 milliLiter(s) Oral every 4 hours PRN Dyspepsia  melatonin 3 milliGRAM(s) Oral at bedtime PRN Insomnia  ondansetron Injectable 4 milliGRAM(s) IV Push every 8 hours PRN Nausea and/or Vomiting      CAPILLARY BLOOD GLUCOSE        I&O's Summary      PHYSICAL EXAM:  Vital Signs Last 24 Hrs  T(C): 36.2 (17 Apr 2024 13:17), Max: 36.7 (16 Apr 2024 17:50)  T(F): 97.2 (17 Apr 2024 13:17), Max: 98 (16 Apr 2024 17:50)  HR: 71 (17 Apr 2024 13:17) (71 - 105)  BP: 112/70 (17 Apr 2024 13:17) (111/61 - 145/85)  BP(mean): 87 (16 Apr 2024 19:50) (87 - 98)  RR: 20 (17 Apr 2024 13:17) (20 - 20)  SpO2: 100% (17 Apr 2024 13:17) (97% - 100%)    Parameters below as of 17 Apr 2024 13:17  Patient On (Oxygen Delivery Method): room air    CONSTITUTIONAL: Well groomed, no apparent distress  EYES: PERRLA , EOMI  ENMT: MMM. Normal dentition  RESP: No respiratory distress, CTA b/l  CV: +S1S2, no MRG; 3+ b/L LE edema iso lymphedema   GI: Soft, NTND, no RGR  MSK: contracted LE w/ pronounced swelling; lichenified skin  SKIN: dry scaly, swollen b/l LE lymphedema   NEURO: CN II-XII grossly intact; normal reflexes  PSYCH: A+O x 3    LABS:                        7.8    6.45  )-----------( 215      ( 17 Apr 2024 06:37 )             24.0     04-17    133<L>  |  103  |  7   ----------------------------<  108<H>  2.9<LL>   |  24  |  0.34<L>    Ca    8.5      17 Apr 2024 06:39  Phos  2.4     04-17  Mg     1.5     04-17    TPro  8.7<H>  /  Alb  3.2<L>  /  TBili  1.0  /  DBili  x   /  AST  36  /  ALT  10  /  AlkPhos  75  04-16    PT/INR - ( 17 Apr 2024 06:37 )   PT: 13.0 sec;   INR: 1.25 ratio         PTT - ( 16 Apr 2024 18:17 )  PTT:34.0 sec      Urinalysis Basic - ( 17 Apr 2024 06:39 )    Color: x / Appearance: x / SG: x / pH: x  Gluc: 108 mg/dL / Ketone: x  / Bili: x / Urobili: x   Blood: x / Protein: x / Nitrite: x   Leuk Esterase: x / RBC: x / WBC x   Sq Epi: x / Non Sq Epi: x / Bacteria: x        Culture - Abscess with Gram Stain (collected 16 Apr 2024 21:05)  Source: .Abscess right foot wound culture  Gram Stain (17 Apr 2024 05:56):    Rare polymorphonuclear leukocytes seen per low power field    Numerous Gram positive cocci in pairs seen per oil power field    Numerous Gram Variable Rods seen per oil power field        Tele Reviewed:    RADIOLOGY & ADDITIONAL TESTS:  Results Reviewed:   Imaging Personally Reviewed:  Electrocardiogram Personally Reviewed:

## 2024-04-17 NOTE — PATIENT PROFILE ADULT - FALL HARM RISK - HARM RISK INTERVENTIONS

## 2024-04-17 NOTE — PHYSICAL THERAPY INITIAL EVALUATION ADULT - GENERAL OBSERVATIONS, REHAB EVAL
Pt received semi-supine in bed in NAD, +IV Lock, +b/L feet bandaged in gauze C/D/I. Pt AOx4, pleasant and cooperative. Pt is at his functional baseline.

## 2024-04-17 NOTE — PHYSICAL THERAPY INITIAL EVALUATION ADULT - ADDITIONAL COMMENTS
Pt is wheelchair bound, lives at home, has full time aides to help him. Pt is wheelchair bound, lives at home, has full time aides to help him.     Pt states he lives in a first floor apt with no steps to enter. Pt states he has 24/7 caregivers who use a serafin lift to transfer him from bed to chair and back. Pt states he spends most of his day in a w/c.

## 2024-04-17 NOTE — PROGRESS NOTE ADULT - ASSESSMENT
83M presents w BL LE lymphedema  - Patient seen and evaluated  - Afebrile, no elukocytosis, ESR pending CRP 3.6  -  severe bilateral lower extremity lymphedema with scaly skin dorsally and plantarly. right heel wound with exposed calcaneus measuring 8x 8 cm , with scant serosanguinous drainage, no purulence no erythema, left heel wound fibronecrotic wound bed probing to bed measuring 8x 5cm, scant serosanguinous drainage no purulence.  left wound 5th styloid wound measuring 2x2 cm with graft and staples intact.   - Right foot wound culture pending   - Bilateral foot foot xrays: Age-indeterminate erosion of the fifth metatarsal base, no gas  -  SUKH/PVR- pending - recommending vasc consult   - Pod plan local wound care vs bilateral wounds debridement pending imaging /vasc recs  - Please document medical clearance for possible podiatry procedure under anesthesia  - Seen with attending  83M presents w BL LE lymphedema  - Patient seen and evaluated  - Afebrile, no elukocytosis, ESR pending CRP 3.6  -  severe bilateral lower extremity lymphedema with scaly skin dorsally and plantarly. right heel wound w exposed calcaneus, fibroganular woudn bed  measuring 8x 8 cm , scant serosanguinous drainage, no purulence, no erythema, left heel wound fibronecrotic wound bed probing to bone measuring 8x 5cm, scant serosanguinous drainage no purulence.  left wound 5th styloid wound measuring 2x2 cm with graft and staples kept intact, non boggy   -  Right foot wound culture pending   - Bilateral foot foot xrays: Age-indeterminate erosion of the fifth metatarsal base, no gas  -  SUKH/PVR- pending - recommending vasc consult   - Medical clearance for possible podiatry procedure appreciated  - Pod plan local wound care vs bilateral wounds debridement pending vasc recs  - Seen with attending

## 2024-04-17 NOTE — PHYSICAL THERAPY INITIAL EVALUATION ADULT - LEVEL OF INDEPENDENCE: SIT/STAND, REHAB EVAL
pt stating he has too much pain in standing and has not stood up on his feet in approx 9 months/unable to perform

## 2024-04-17 NOTE — PHYSICAL THERAPY INITIAL EVALUATION ADULT - PERTINENT HX OF CURRENT PROBLEM, REHAB EVAL
83y M pmh htn, chronic LE lymphedema presents for eval of foot wound. a/f further eval/ tx of leg wound. Dx: Lymphedema, Anemia, HTN, Preoperative exam, DVT ppx, Medication management. 83y M pmh htn, chronic LE lymphedema presents for eval of foot wound. a/f further eval/ tx of leg wound. Dx: Lymphedema, Anemia, HTN, Preoperative exam, DVT ppx, Medication management. Xray Foot AP + Lateral, Right: no fracture or dislocation. 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 Age-indeterminate erosion of the fifth metatarsal base,   for which correlation for acute osteomyelitis recommended. No visualized   soft tissue gas.

## 2024-04-17 NOTE — PROGRESS NOTE ADULT - CONVERSATION DETAILS
Patient reports being estranged from his adult children and having no contact with him.  His emergency contact is a longterm friend [employer]ci[employer] that has all his medical information.  He states that he wants to live, and would want aggressive measures to sustain life and 'keep my heart beating'.  He understands aggressiveness of cpr with ribs breaking and being put on life support and to him he feels it is worth it to stay alive.  Will follow up with Leoncio if she already has HCP paperwork and if not will complete in the hospital.

## 2024-04-17 NOTE — PROGRESS NOTE ADULT - PROBLEM SELECTOR PLAN 2
Hg  9.8  (bL unclear, no prior in chart )   - Ddx: AoCD vs NORAH   - Check iron studies, b12, folate

## 2024-04-17 NOTE — PHYSICAL THERAPY INITIAL EVALUATION ADULT - DIAGNOSIS, PT EVAL
Pt does not require skilled PT services at this time, however, pt would benefit from being out of bed daily to chair.

## 2024-04-17 NOTE — PROGRESS NOTE ADULT - ATTENDING COMMENTS
84yo M pmh HTN, chronic LE lymphedema, wheelchair bound with b/l leg contractures after falling from ladder years ago, presents 4/16 for concern of chronic osteomyelitis, pending MRI, podiatry and vascular eval, not septic, monitoring off abx for now.      1: possible chronic osteomyelitis:  Afebrile, AVSS, normal wbc.  CRP elevated at 36.  Xray Foot AP + Lateral, Right: There is no fracture or dislocation. There is Charcot configuration of the foot/ankle. Age-indeterminate erosion of the fifth metatarsal base, for which correlation for acute osteomyelitis recommended. No visualized soft tissue gas.  Xray Foot AP + Lateral + Oblique, Left: Plantar calcaneal and possibly plantar 5th metatarsal base osteomyelitis.   -per patient he recently was on abx stopped ~8 days ago, and had recent procedure to his feet at OSH.  No records available in [facility].  -appreciate podiatry consult:  -severe bilateral lower extremity lymphedema with scaly skin dorsally and plantarly. right heel wound with exposed calcaneus measuring 8x 8 cm , with scant serosanguinous drainage, no purulence no erythema, left heel wound fibronecrotic wound bed probing to bed measuring 8x 5cm, scant serosanguinous drainage no purulence.  left wound 5th styloid wound measuring 2x2 cm with graft and staples intact.   - Right foot wound culture pending: gram + cocci in pairs   - Bilateral foot foot xrays: Age-indeterminate erosion of the fifth metatarsal base, no gas  -  SUKH/PVR:  The right SUKH of 1.39 and the left SUKH of 1.29 are normal. The reduced right toe-brachial index of 0.50,and the reduced amplitude   of the photoplethysmography at the right great toe is suggestive of disease affecting the small arteries of the right foot.  - Pod plan local wound care vs bilateral wounds debridement pending imaging /vasc recs  -f/u vascular recs  -as he is hemodynamically stable and not septic will hold abx for now to allow for better cultures with any procedure planned  -check lipid panel, a1c    2.Anemia: MCV 93.8  unclear baseline, check iron studies, folate, b12, retic count, ferritin    3. HTN: C/w metoprolol  - Hold lasix give mild hypoNa  - Trend labs.    4.PPX: h  PT consult  f/u with friend Leoncio Merchant about HCP paperwork    contact: TEAMS

## 2024-04-18 LAB
A1C WITH ESTIMATED AVERAGE GLUCOSE RESULT: 5.8 % — HIGH (ref 4–5.6)
ANION GAP SERPL CALC-SCNC: 6 MMOL/L — SIGNIFICANT CHANGE UP (ref 5–17)
BLD GP AB SCN SERPL QL: NEGATIVE — SIGNIFICANT CHANGE UP
BUN SERPL-MCNC: 6 MG/DL — LOW (ref 7–23)
CALCIUM SERPL-MCNC: 9 MG/DL — SIGNIFICANT CHANGE UP (ref 8.4–10.5)
CHLORIDE SERPL-SCNC: 106 MMOL/L — SIGNIFICANT CHANGE UP (ref 96–108)
CHOLEST SERPL-MCNC: 98 MG/DL — SIGNIFICANT CHANGE UP
CO2 SERPL-SCNC: 27 MMOL/L — SIGNIFICANT CHANGE UP (ref 22–31)
CREAT SERPL-MCNC: 0.36 MG/DL — LOW (ref 0.5–1.3)
EGFR: 113 ML/MIN/1.73M2 — SIGNIFICANT CHANGE UP
ERYTHROCYTE [SEDIMENTATION RATE] IN BLOOD: 91 MM/HR — HIGH (ref 0–20)
ESTIMATED AVERAGE GLUCOSE: 120 MG/DL — HIGH (ref 68–114)
GLUCOSE SERPL-MCNC: 128 MG/DL — HIGH (ref 70–99)
HCT VFR BLD CALC: 25.6 % — LOW (ref 39–50)
HDLC SERPL-MCNC: 34 MG/DL — LOW
HGB BLD-MCNC: 8.5 G/DL — LOW (ref 13–17)
LIPID PNL WITH DIRECT LDL SERPL: 53 MG/DL — SIGNIFICANT CHANGE UP
MAGNESIUM SERPL-MCNC: 1.5 MG/DL — LOW (ref 1.6–2.6)
MCHC RBC-ENTMCNC: 31.5 PG — SIGNIFICANT CHANGE UP (ref 27–34)
MCHC RBC-ENTMCNC: 33.2 GM/DL — SIGNIFICANT CHANGE UP (ref 32–36)
MCV RBC AUTO: 94.8 FL — SIGNIFICANT CHANGE UP (ref 80–100)
MRSA PCR RESULT.: DETECTED
NON HDL CHOLESTEROL: 65 MG/DL — SIGNIFICANT CHANGE UP
NRBC # BLD: 0 /100 WBCS — SIGNIFICANT CHANGE UP (ref 0–0)
PHOSPHATE SERPL-MCNC: 2.2 MG/DL — LOW (ref 2.5–4.5)
PLATELET # BLD AUTO: 252 K/UL — SIGNIFICANT CHANGE UP (ref 150–400)
POTASSIUM SERPL-MCNC: 3.6 MMOL/L — SIGNIFICANT CHANGE UP (ref 3.5–5.3)
POTASSIUM SERPL-SCNC: 3.6 MMOL/L — SIGNIFICANT CHANGE UP (ref 3.5–5.3)
RBC # BLD: 2.7 M/UL — LOW (ref 4.2–5.8)
RBC # FLD: 19.5 % — HIGH (ref 10.3–14.5)
RH IG SCN BLD-IMP: POSITIVE — SIGNIFICANT CHANGE UP
S AUREUS DNA NOSE QL NAA+PROBE: DETECTED
SODIUM SERPL-SCNC: 139 MMOL/L — SIGNIFICANT CHANGE UP (ref 135–145)
TRIGL SERPL-MCNC: 46 MG/DL — SIGNIFICANT CHANGE UP
WBC # BLD: 7.37 K/UL — SIGNIFICANT CHANGE UP (ref 3.8–10.5)
WBC # FLD AUTO: 7.37 K/UL — SIGNIFICANT CHANGE UP (ref 3.8–10.5)

## 2024-04-18 PROCEDURE — 99232 SBSQ HOSP IP/OBS MODERATE 35: CPT | Mod: GC

## 2024-04-18 PROCEDURE — 99223 1ST HOSP IP/OBS HIGH 75: CPT

## 2024-04-18 RX ORDER — OXYCODONE HYDROCHLORIDE 5 MG/1
2.5 TABLET ORAL ONCE
Refills: 0 | Status: DISCONTINUED | OUTPATIENT
Start: 2024-04-18 | End: 2024-04-18

## 2024-04-18 RX ORDER — MAGNESIUM SULFATE 500 MG/ML
1 VIAL (ML) INJECTION ONCE
Refills: 0 | Status: COMPLETED | OUTPATIENT
Start: 2024-04-18 | End: 2024-04-18

## 2024-04-18 RX ORDER — POTASSIUM PHOSPHATE, MONOBASIC POTASSIUM PHOSPHATE, DIBASIC 236; 224 MG/ML; MG/ML
30 INJECTION, SOLUTION INTRAVENOUS ONCE
Refills: 0 | Status: COMPLETED | OUTPATIENT
Start: 2024-04-18 | End: 2024-04-18

## 2024-04-18 RX ADMIN — CHLORHEXIDINE GLUCONATE 1 APPLICATION(S): 213 SOLUTION TOPICAL at 13:20

## 2024-04-18 RX ADMIN — OXYCODONE HYDROCHLORIDE 2.5 MILLIGRAM(S): 5 TABLET ORAL at 23:37

## 2024-04-18 RX ADMIN — Medication 12.5 MILLIGRAM(S): at 06:19

## 2024-04-18 RX ADMIN — GABAPENTIN 300 MILLIGRAM(S): 400 CAPSULE ORAL at 06:19

## 2024-04-18 RX ADMIN — PANTOPRAZOLE SODIUM 40 MILLIGRAM(S): 20 TABLET, DELAYED RELEASE ORAL at 06:19

## 2024-04-18 RX ADMIN — GABAPENTIN 300 MILLIGRAM(S): 400 CAPSULE ORAL at 23:19

## 2024-04-18 RX ADMIN — HEPARIN SODIUM 5000 UNIT(S): 5000 INJECTION INTRAVENOUS; SUBCUTANEOUS at 18:09

## 2024-04-18 RX ADMIN — POTASSIUM PHOSPHATE, MONOBASIC POTASSIUM PHOSPHATE, DIBASIC 83.33 MILLIMOLE(S): 236; 224 INJECTION, SOLUTION INTRAVENOUS at 14:57

## 2024-04-18 RX ADMIN — HEPARIN SODIUM 5000 UNIT(S): 5000 INJECTION INTRAVENOUS; SUBCUTANEOUS at 06:18

## 2024-04-18 RX ADMIN — GABAPENTIN 300 MILLIGRAM(S): 400 CAPSULE ORAL at 14:57

## 2024-04-18 RX ADMIN — OXYCODONE HYDROCHLORIDE 2.5 MILLIGRAM(S): 5 TABLET ORAL at 06:22

## 2024-04-18 RX ADMIN — Medication 12.5 MILLIGRAM(S): at 18:09

## 2024-04-18 RX ADMIN — OXYCODONE HYDROCHLORIDE 2.5 MILLIGRAM(S): 5 TABLET ORAL at 07:14

## 2024-04-18 RX ADMIN — Medication 100 GRAM(S): at 13:20

## 2024-04-18 NOTE — DIETITIAN INITIAL EVALUATION ADULT - PERTINENT MEDS FT
MEDICATIONS  (STANDING):  chlorhexidine 4% Liquid 1 Application(s) Topical daily  Dakins Solution - 1/2 Strength 1 Application(s) Topical daily  gabapentin 300 milliGRAM(s) Oral three times a day  heparin   Injectable 5000 Unit(s) SubCutaneous every 12 hours  metoprolol tartrate 12.5 milliGRAM(s) Oral two times a day  pantoprazole    Tablet 40 milliGRAM(s) Oral before breakfast    MEDICATIONS  (PRN):  acetaminophen     Tablet .. 650 milliGRAM(s) Oral every 6 hours PRN Temp greater or equal to 38C (100.4F), Mild Pain (1 - 3)  aluminum hydroxide/magnesium hydroxide/simethicone Suspension 30 milliLiter(s) Oral every 4 hours PRN Dyspepsia  melatonin 3 milliGRAM(s) Oral at bedtime PRN Insomnia  ondansetron Injectable 4 milliGRAM(s) IV Push every 8 hours PRN Nausea and/or Vomiting

## 2024-04-18 NOTE — CONSULT NOTE ADULT - SUBJECTIVE AND OBJECTIVE BOX
Patient seen and evaluated at bedside    Chief Complaint: Foot wounds    HPI:  82 YO Man with  Mhx of htn, chronic LE lymphedema presents for eval of foot wound.  He states for one week he has had worsening pain with bleeding throughout the sole of the right foot. Was seen by wound care Podiatrist who sent pt to ED for further eval/ tx of leg wound. Pt is wheelchair bound, lives at home, has full time aides to help him. He was admitted for concern for chronic osteo and has been seen by podiatry. Vascular cardiology consulted after abnormal Toe brachial Index. OF note, patient reports that his LE is chronic, at least for 3 years R>L, and follow with a podiatrist.     PMHx:   HTN (hypertension)    HLD (hyperlipidemia)    Lymphedema        PSHx:       Allergies:  No Known Allergies      Home Meds:    Current Medications:   acetaminophen     Tablet .. 650 milliGRAM(s) Oral every 6 hours PRN  aluminum hydroxide/magnesium hydroxide/simethicone Suspension 30 milliLiter(s) Oral every 4 hours PRN  chlorhexidine 4% Liquid 1 Application(s) Topical daily  Dakins Solution - 1/2 Strength 1 Application(s) Topical daily  gabapentin 300 milliGRAM(s) Oral three times a day  heparin   Injectable 5000 Unit(s) SubCutaneous every 12 hours  magnesium sulfate  IVPB 1 Gram(s) IV Intermittent once  melatonin 3 milliGRAM(s) Oral at bedtime PRN  metoprolol tartrate 12.5 milliGRAM(s) Oral two times a day  ondansetron Injectable 4 milliGRAM(s) IV Push every 8 hours PRN  pantoprazole    Tablet 40 milliGRAM(s) Oral before breakfast  potassium phosphate IVPB 30 milliMole(s) IV Intermittent once      FAMILY HISTORY:  noncontriutory    Social History:  Smoking History:former  Alcohol Use:denies  Drug Use:denies    REVIEW OF SYSTEMS:  Constitutional:     [x ] negative [ ] fevers [ ] chills [ ] weight loss [ ] weight gain  HEENT:                  [ x] negative [ ] dry eyes [ ] eye irritation [ ] postnasal drip [ ] nasal congestion  CV:                         [x ] negative  [ ] chest pain [ ] orthopnea [ ] palpitations [ ] murmur  Resp:                     [x ] negative [ ] cough [ ] shortness of breath [ ] dyspnea [ ] wheezing [ ] sputum [ ]hemoptysis  GI:                          [ x] negative [ ] nausea [ ] vomiting [ ] diarrhea [ ] constipation [ ] abd pain [ ] dysphagia   :                        [x ] negative [ ] dysuria [ ] nocturia [ ] hematuria [ ] increased urinary frequency  Musculoskeletal: [x ] negative [ ] back pain [ ] myalgias [ ] arthralgias [ ] fracture  Skin:                       [x ] negative [ ] rash [ ] itch  Neurological:        [ x] negative [ ] headache [ ] dizziness [ ] syncope [ ] weakness [ ] numbness  Psychiatric:           [x ] negative [ ] anxiety [ ] depression  Endocrine:            [x ] negative [ ] diabetes [ ] thyroid problem  Heme/Lymph:      [x ] negative [ ] anemia [ ] bleeding problem  Allergic/Immune: [x ] negative [ ] itchy eyes [ ] nasal discharge [ ] hives [ ] angioedema    [ x] All other systems negative        Physical Exam:  T(F): 98.7 (04-18), Max: 98.7 (04-18)  HR: 84 (04-18) (67 - 84)  BP: 109/51 (04-18) (100/60 - 124/65)  RR: 18 (04-18)  SpO2: 98% (04-18)  GENERAL: No acute distress, well-developed  HEAD:  Atraumatic, Normocephalic  ENT: EOMI, PERRLA, conjunctiva and sclera clear, Neck supple, No JVD, moist mucosa  CHEST/LUNG: Clear to auscultation bilaterally; No wheeze, equal breath sounds bilaterally   BACK: No spinal tenderness  HEART: Regular rate and rhythm; No murmurs, rubs, or gallops  ABDOMEN: Soft, Nontender, Nondistended; Bowel sounds present  EXTREMITIES: Significant B/L LE edema with + stemmer sign, dry/scaly skin. Bilateral heel Wounds with scant drainage.  left wound 5th styloid wound measuring with graft and staples kept intact, wrapped bilat- Pulses not palpable 2/2 significant edema  PSYCH: Nl behavior, nl affect  NEUROLOGY: AAOx3, non-focal, cranial nerves intact  SKIN: Normal color, No rashes or lesions          CXR: Personally reviewed    Labs: Personally reviewed                        8.5    7.37  )-----------( 252      ( 18 Apr 2024 09:40 )             25.6     04-18    139  |  106  |  6<L>  ----------------------------<  128<H>  3.6   |  27  |  0.36<L>    Ca    9.0      18 Apr 2024 09:40  Phos  2.2     04-18  Mg     1.5     04-18    TPro  8.7<H>  /  Alb  3.2<L>  /  TBili  1.0  /  DBili  x   /  AST  36  /  ALT  10  /  AlkPhos  75  04-16    PT/INR - ( 17 Apr 2024 06:37 )   PT: 13.0 sec;   INR: 1.25 ratio         PTT - ( 16 Apr 2024 18:17 )  PTT:34.0 sec

## 2024-04-18 NOTE — ADVANCED PRACTICE NURSE CONSULT - REASON FOR CONSULT
Consult to assess patient skin initiated by RN for bilateral Shin. Healing pressure ulcer on Sacrum, present on admission.      History of Present Illness:  Reason for Admission: Wound Check  History of Present Illness:   83y M pmh htn, chronic LE lymphedema presents for eval of foot wound.  He states for one week he has had worsening pain with bleeding throughout the sole of the right foot. Was seen by wound care Podiatrist who sent pt to ED for further eval/ tx of leg wound. Pt is wheelchair bound, lives at home, has full time aides to help him.     ROS: Denies CP, SOB, palpitation, N/V/D, fever, cough, chills, dizziness, abm pain, recent travel, sick contact, change in bowel and urinary habits     A 10-system ROS was performed and is negative except as noted above and/or in the HPI. 6

## 2024-04-18 NOTE — DIETITIAN INITIAL EVALUATION ADULT - REASON INDICATOR FOR ASSESSMENT
Nutrition Consult for MST score 2 or > (Unintentional weight loss & decreased appetite).   Source: Pt, Electronic Medical Record.   Chart reviewed, events noted.

## 2024-04-18 NOTE — CONSULT NOTE ADULT - ASSESSMENT
82 YO Man with  Mhx of htn, chronic LE lymphedema with concern for possible chronic osteo,  presents for eval of foot wounds. Vascular cardiology consulted for abnormal Toe Brachial Index.     # Abnormal Toe Brachial Index  -right  toe-brachial index equals 0.50 which is mildy abnormal, and likely related to the lymphedema. There is reasonable pulsatility  - Please obtain TTE  -Please obtain Arterial  Duplex of B/L LE extremities (legs should be moisturized and wrapped, discussed with patient, he is hesitant at present time, but will try)  -Please obtain woudn care consult  -Please obtain BNP     Fred Nichols, Cardiology Fellow, F-2    For all New Consults and Questions:  [email or web address]   Login: [email or web address]    *** Note not final until signed by attending

## 2024-04-18 NOTE — ADVANCED PRACTICE NURSE CONSULT - ASSESSMENT
arrived on unit, patient was found lying in a low air loss pressure redistribution support surface style bed. The patient  is unable to turn independently and staff assistance x 1was provided. Once turned,  able to view her skin. patient bilateral lower extremity contracted , Once turned,  incontinence of urine and stool  was apparent and xiao care was provided. patient "declined the use of any topical treatment on his body,  stated makes his skin worse". the patient was educated on the condition of his skin and the benefits of treatment. patient verbalized understanding by teach back however refused treatment of any topical treatement.     Xiao rectal area with erythema and indurated consistent with incontinence dermatitis.  bilateral sacrum/coccyx / buttocks non-blanchable deep red, maroon discoloration and hyperpigmentation and contracted hypopigmentation with an multiple areas area  partial - thickness  and tissue loss of epidermis  consistent with a deep tissue  evolution  size approximately 15 cm x 10 cm x 0.1 cm.  present  on admission.  bilateral feet/ heels / consulted by podiatry.   severe bilateral lower extremity lymphedema with scaly skin ventral and dorsally.   patient  was educated  on the importance  for turning  and positioning  every 2 hours. The use of waffle cushion when out of bed  to chair  and to shift his Weight every 2 hours while in chair. The importance a keeping skin clean and dry and  to offload feet/heels , and optimal nutrition.

## 2024-04-18 NOTE — DIETITIAN INITIAL EVALUATION ADULT - ORAL NUTRITION SUPPLEMENTS
Pt amenable to receiving Ensure Plus oral nutrition supplements 2x/day to optimize protein-energy intake.

## 2024-04-18 NOTE — DIETITIAN INITIAL EVALUATION ADULT - CONTINUE CURRENT NUTRITION CARE PLAN
Is This A New Presentation, Or A Follow-Up?: Skin Lesions How Severe Is Your Skin Lesion?: mild Have Your Skin Lesions Been Treated?: not been treated yes

## 2024-04-18 NOTE — DIETITIAN INITIAL EVALUATION ADULT - ADD RECOMMEND
1) Continue DASH diet as tolerated.   	- Consider liberalizing to Regular diet free of therapeutic restrictions if PO intake inadequately persists. Defer texture/consistency to SLP/team.   2) Recommend Multivitamin and vitamin C daily to promote wound healing pending no medical contraindications.  3) Continue to monitor PO intake, weight, labs, skin, GI status, and diet.  5) RD remains available for diet education PRN.  6) Malnutrition sticker placed in chart.

## 2024-04-18 NOTE — PROGRESS NOTE ADULT - SUBJECTIVE AND OBJECTIVE BOX
Patient is a 83y old  Male who presents with a chief complaint of Unspecified open wound, unspecified foot, initial encounter     (18 Apr 2024 10:33)       INTERVAL HPI/OVERNIGHT EVENTS:  Patient seen and evaluated at bedside.  Pt is resting comfortable in NAD. Denies N/V/F/C.    Allergies    No Known Allergies    Intolerances        Vital Signs Last 24 Hrs  T(C): 37.1 (18 Apr 2024 04:48), Max: 37.1 (18 Apr 2024 04:48)  T(F): 98.7 (18 Apr 2024 04:48), Max: 98.7 (18 Apr 2024 04:48)  HR: 84 (18 Apr 2024 04:48) (67 - 84)  BP: 109/51 (18 Apr 2024 06:19) (100/60 - 124/65)  BP(mean): --  RR: 18 (18 Apr 2024 04:48) (18 - 20)  SpO2: 98% (18 Apr 2024 04:48) (97% - 100%)    Parameters below as of 18 Apr 2024 04:48  Patient On (Oxygen Delivery Method): room air        LABS:                        8.5    7.37  )-----------( 252      ( 18 Apr 2024 09:40 )             25.6     04-17    133<L>  |  103  |  7   ----------------------------<  108<H>  2.9<LL>   |  24  |  0.34<L>    Ca    8.5      17 Apr 2024 06:39  Phos  2.4     04-17  Mg     1.5     04-17    TPro  8.7<H>  /  Alb  3.2<L>  /  TBili  1.0  /  DBili  x   /  AST  36  /  ALT  10  /  AlkPhos  75  04-16    PT/INR - ( 17 Apr 2024 06:37 )   PT: 13.0 sec;   INR: 1.25 ratio         PTT - ( 16 Apr 2024 18:17 )  PTT:34.0 sec  Urinalysis Basic - ( 17 Apr 2024 06:39 )    Color: x / Appearance: x / SG: x / pH: x  Gluc: 108 mg/dL / Ketone: x  / Bili: x / Urobili: x   Blood: x / Protein: x / Nitrite: x   Leuk Esterase: x / RBC: x / WBC x   Sq Epi: x / Non Sq Epi: x / Bacteria: x      CAPILLARY BLOOD GLUCOSE          Lower Extremity Physical Exam:  Vascular: DP/PT 0/4 2/2 to edema B/L, CFT <4 seconds B/L, Temperature gradient warm to cool, B/L.   Neuro: Epicritic sensation intact to the level of toes, B/L.  Musculoskeletal/Ortho: contracted  Skin: Severe bilateral lower extremity lymphedema with scaly skin dorsally and plantarly. right heel wound w exposed calcaneus, fibroganular woudn bed  measuring 8x 8 cm , scant serosanguinous drainage, no purulence, no erythema, left heel wound fibronecrotic wound bed probing to bone measuring 8x 5cm, scant serosanguinous drainage no purulence.  left wound 5th styloid wound measuring 2x2 cm with graft and staples kept intact, non-boggy, no acute signs of infection B/L.       RADIOLOGY & ADDITIONAL TESTS:  < from: Xray Foot AP + Lateral + Oblique, Left (04.16.24 @ 22:27) >    ACC: [id number] EXAM:  XR FOOT COMP MIN 3 VIEWS LT   ORDERED BY:  JÚNIOR THEODORE     PROCEDURE DATE:  04/16/2024          INTERPRETATION:  CLINICAL INDICATION: Left foot swelling, evaluate for   osteomyelitis and gas    TECHNIQUE: 3 views of the left foot    COMPARISON: None available.    FINDINGS:  Soft tissue swelling, focally prominent dorsal forefoot region. No   tracking gas collections. Plantar heel and plantar midfoot soft tissue   ulcerations.    No acute fracture. Age indeterminant erosion at the base of the fifth   metatarsal and plantar calcaneus.  Joint spaces are maintained.  Soft tissue swelling at the dorsal aspect of the forefoot. Likely soft   tissue ulcer at the plantar aspect of the calcaneus. No definitive gas   tracking.    IMPRESSION:  Plantar calcaneal and possibly plantar 5th metatarsal base osteomyelitis.   MRI can be obtained to further assess as warranted.    --- End of Report ---          ANDI NAGY MD; Resident Radiologist  This document has been electronically signed.  JESÚS KELLEY MD; Attending Radiologist  This document has been electronically signed. Apr 17 2024  9:31AM    < end of copied text >

## 2024-04-18 NOTE — DIETITIAN INITIAL EVALUATION ADULT - NSFNSGIIOFT_GEN_A_CORE
Pt denies any current N/V/D/C; ordered for zofran. Per chart, last BM 4/17. No current bowel regimen in place.

## 2024-04-18 NOTE — PROGRESS NOTE ADULT - ASSESSMENT
83M presents w BL LE lymphedema  - Patient seen and evaluated  - Afebrile, no elukocytosis, ESR pending CRP 3.6  - Severe bilateral lower extremity lymphedema with scaly skin dorsally and plantarly. right heel wound w exposed calcaneus, fibroganular woudn bed  measuring 8x 8 cm , scant serosanguinous drainage, no purulence, no erythema, left heel wound fibronecrotic wound bed probing to bone measuring 8x 5cm, scant serosanguinous drainage no purulence.  left wound 5th styloid wound measuring 2x2 cm with graft and staples kept intact, non-boggy, no acute signs of infection B/L.   - Right foot wound culture: gram positive cocci, gram negative rods   - Bilateral foot foot xrays: Age-indeterminate erosion of the fifth metatarsal base, no gas  - SUKH/PVR: RABI 1.39, LABI 1.29  - Recommend vascular consult   - Medical clearance for possible podiatry procedure appreciated  - Pod plan local wound care vs bilateral wounds debridement pending Vascular recommendations   - Discussed with attending

## 2024-04-18 NOTE — PROGRESS NOTE ADULT - SUBJECTIVE AND OBJECTIVE BOX
PROGRESS NOTE:   Authored by Dr. Juan Manuel Craig MD (PGY-1). Pager [facility] [phone] / [facility]     Patient is a 83y old  Male who presents with a chief complaint of Wound Check (18 Apr 2024 15:29)      SUBJECTIVE / OVERNIGHT EVENTS:  No acute events overnight. Still endorsing b/l foot pain    ADDITIONAL REVIEW OF SYSTEMS:  Patient denies fevers, chills, chest pain, shortness of breath, nausea, abdominal pain, diarrhea, constipation, dysuria, leg swelling, headache, light headedness.    MEDICATIONS  (STANDING):  chlorhexidine 4% Liquid 1 Application(s) Topical daily  Dakins Solution - 1/2 Strength 1 Application(s) Topical daily  gabapentin 300 milliGRAM(s) Oral three times a day  heparin   Injectable 5000 Unit(s) SubCutaneous every 12 hours  metoprolol tartrate 12.5 milliGRAM(s) Oral two times a day  pantoprazole    Tablet 40 milliGRAM(s) Oral before breakfast    MEDICATIONS  (PRN):  acetaminophen     Tablet .. 650 milliGRAM(s) Oral every 6 hours PRN Temp greater or equal to 38C (100.4F), Mild Pain (1 - 3)  aluminum hydroxide/magnesium hydroxide/simethicone Suspension 30 milliLiter(s) Oral every 4 hours PRN Dyspepsia  melatonin 3 milliGRAM(s) Oral at bedtime PRN Insomnia  ondansetron Injectable 4 milliGRAM(s) IV Push every 8 hours PRN Nausea and/or Vomiting      CAPILLARY BLOOD GLUCOSE        I&O's Summary    17 Apr 2024 07:01  -  18 Apr 2024 07:00  --------------------------------------------------------  IN: 0 mL / OUT: 300 mL / NET: -300 mL        PHYSICAL EXAM:  Vital Signs Last 24 Hrs  T(C): 36.8 (18 Apr 2024 14:51), Max: 37.1 (18 Apr 2024 04:48)  T(F): 98.2 (18 Apr 2024 14:51), Max: 98.7 (18 Apr 2024 04:48)  HR: 87 (18 Apr 2024 14:51) (67 - 87)  BP: 122/68 (18 Apr 2024 14:51) (100/60 - 124/65)  BP(mean): --  RR: 18 (18 Apr 2024 14:51) (18 - 19)  SpO2: 97% (18 Apr 2024 14:51) (97% - 98%)    Parameters below as of 18 Apr 2024 14:51  Patient On (Oxygen Delivery Method): room air    CONSTITUTIONAL: Well groomed, no apparent distress  EYES: PERRLA , EOMI  ENMT: MMM. Normal dentition  RESP: No respiratory distress, CTA b/l  CV: +S1S2, no MRG; 3+ b/L LE edema iso lymphedema   GI: Soft, NTND, no RGR  MSK: contracted LE w/ pronounced swelling; lichenified skin  SKIN: dry scaly, swollen b/l LE lymphedema   NEURO: CN II-XII grossly intact; normal reflexes  PSYCH: A+O x 3    LABS:                        8.5    7.37  )-----------( 252      ( 18 Apr 2024 09:40 )             25.6     04-18    139  |  106  |  6<L>  ----------------------------<  128<H>  3.6   |  27  |  0.36<L>    Ca    9.0      18 Apr 2024 09:40  Phos  2.2     04-18  Mg     1.5     04-18    TPro  8.7<H>  /  Alb  3.2<L>  /  TBili  1.0  /  DBili  x   /  AST  36  /  ALT  10  /  AlkPhos  75  04-16    PT/INR - ( 17 Apr 2024 06:37 )   PT: 13.0 sec;   INR: 1.25 ratio         PTT - ( 16 Apr 2024 18:17 )  PTT:34.0 sec      Urinalysis Basic - ( 18 Apr 2024 09:40 )    Color: x / Appearance: x / SG: x / pH: x  Gluc: 128 mg/dL / Ketone: x  / Bili: x / Urobili: x   Blood: x / Protein: x / Nitrite: x   Leuk Esterase: x / RBC: x / WBC x   Sq Epi: x / Non Sq Epi: x / Bacteria: x        Culture - Abscess with Gram Stain (collected 16 Apr 2024 21:05)  Source: .Abscess right foot wound culture  Gram Stain (17 Apr 2024 05:56):    Rare polymorphonuclear leukocytes seen per low power field    Numerous Gram positive cocci in pairs seen per oil power field    Numerous Gram Variable Rods seen per oil power field  Preliminary Report (18 Apr 2024 14:18):    Culture yields >4 types of aerobic and/or anaerobic bacteria    Call client services within 7 days if further workup is clinically    indicated.    Culture - Blood (collected 16 Apr 2024 17:31)  Source: .Blood Blood-Peripheral  Preliminary Report (17 Apr 2024 23:08):    No growth at 24 hours    Culture - Blood (collected 16 Apr 2024 17:16)  Source: .Blood Blood-Peripheral  Preliminary Report (17 Apr 2024 23:08):    No growth at 24 hours        Tele Reviewed:    RADIOLOGY & ADDITIONAL TESTS:  Results Reviewed:   Imaging Personally Reviewed:  Electrocardiogram Personally Reviewed:

## 2024-04-18 NOTE — DIETITIAN INITIAL EVALUATION ADULT - PERTINENT LABORATORY DATA
04-17    133<L>  |  103  |  7   ----------------------------<  108<H>  2.9<LL>   |  24  |  0.34<L>    Ca    8.5      17 Apr 2024 06:39  Phos  2.4     04-17  Mg     1.5     04-17    TPro  8.7<H>  /  Alb  3.2<L>  /  TBili  1.0  /  DBili  x   /  AST  36  /  ALT  10  /  AlkPhos  75  04-16

## 2024-04-18 NOTE — DIETITIAN INITIAL EVALUATION ADULT - OTHER INFO
It was a pleasure seeing you again today.    PLAN DETAILS:  1) Start Celecoxib (Celebrex) 100mg once daily  2) I removed Gabapentin (Neurontin) capsules from your medication list since you are pretty sure you are no longer taking that medication.  If you find this medication at home and you are still taking it, please let me know and I'll add it back onto your medication list.  3) For Diabetes, check your blood sugar every morning before breakfast.  If your blood sugar is greater than 120 mg/dL then increase your Basaglar dose by 1 unit from the day before.  Track your blood sugars and Basaglar doses on a calendar so that you can reference them.  4) If you have a blood sugar less than 80 mg/dL please call me.     FOLLOW-UP VISIT DETAILS:  I will try calling you in a couple of weeks to check in and see how things are going.  Feel free to call me before then if needed.    If you have any questions or concerns, please feel free to contact me.  If I do not answer my phone, leave a message and I will return your call as soon as I can.    Best Regards,    Doe Packer, PharmD  Clinical Pharmacist  [facility] Phone: [phone]  Direct Office Phone: [phone]          
- UBW: was ~205 pounds x December per pt - endorses weight loss.   - Dosing wt: 165 pounds (4/17, bed scale)  - Bed wt obtained by RD (4/18): 170 pounds   	- As reported: Possible ~40 lb (19.5%) unintentional weight loss x ~5 months - clinically significant.   - [facility]   - RD to continue to monitor weight trends as able. Weight fluctuations possible partially in setting of fluid shifts (pt with severe edema).   - Nutritionally Pertinent Meds in-house: lopressor, PPI.   - Nutritionally Pertinent Labs: low Na; low K; low Phos - Monitor electrolytes daily and replete PRN.   - Cardio: HTN, HLD.

## 2024-04-18 NOTE — DIETITIAN INITIAL EVALUATION ADULT - NSFNSPHYEXAMSKINFT_GEN_A_CORE
No pressure injuries noted. Per Flowsheets, pt with wounds to L heel, L 5th styloid, R heel, R shin, L calf.

## 2024-04-18 NOTE — DIETITIAN INITIAL EVALUATION ADULT - ETIOLOGY-BASIS
Chronic illness Complex Repair Preamble Text (Leave Blank If You Do Not Want): Extensive wide undermining was performed.

## 2024-04-18 NOTE — DIETITIAN INITIAL EVALUATION ADULT - OTHER CALCULATIONS
Fluid needs deferred to team.  Estimated needs using dosing weight with consideration for Malnutrition factor.

## 2024-04-18 NOTE — PROGRESS NOTE ADULT - ATTENDING COMMENTS
82yo M pmh HTN, chronic LE lymphedema, wheelchair bound with b/l leg contractures after falling from ladder years ago, presents 4/16 for concern of chronic osteomyelitis, pending MRI, podiatry and vascular eval, not septic, monitoring off abx for now.      1: possible chronic osteomyelitis:  Afebrile, AVSS, normal wbc.  CRP elevated at 36.  Xray Foot AP + Lateral, Right: There is no fracture or dislocation. There is Charcot configuration of the foot/ankle. Age-indeterminate erosion of the fifth metatarsal base, for which correlation for acute osteomyelitis recommended. No visualized soft tissue gas.  Xray Foot AP + Lateral + Oblique, Left: Plantar calcaneal and possibly plantar 5th metatarsal base osteomyelitis.   -per patient he recently was on abx stopped ~8 days ago, and had recent procedure to his feet at OSH.  [facility] available in [facility].  -appreciate podiatry consult:  -severe bilateral lower extremity lymphedema with scaly skin dorsally and plantarly. right heel wound with exposed calcaneus measuring 8x 8 cm , with scant serosanguinous drainage, no purulence no erythema, left heel wound fibronecrotic wound bed probing to bed measuring 8x 5cm, scant serosanguinous drainage no purulence.  left wound 5th styloid wound measuring 2x2 cm with graft and staples intact.   - Right foot wound culture pending: gram + cocci in pairs   - Bilateral foot foot xrays: Age-indeterminate erosion of the fifth metatarsal base, no gas  -  SUKH/PVR:  The right SUKH of 1.39 and the left SUKH of 1.29 are normal. The reduced right toe-brachial index of 0.50,and the reduced amplitude   of the photoplethysmography at the right great toe is suggestive of disease affecting the small arteries of the right foot.  - Pod plan local wound care vs bilateral wounds debridement pending imaging /vasc recs  -f/u vascular recs: spoke with Dr Patel-f/u echo, bnp  -as he is hemodynamically stable and not septic will hold abx for now to allow for better cultures with any procedure planned  -check lipid panel, a1c  -f/u MRI    2.Anemia: MCV 93.8  unclear baseline, check iron studies, folate, b12, retic count, ferritin 209    3. HTN: C/w metoprolol  - Hold lasix give mild hypoNa  - Trend labs.    4.PPX: [facility]  PT consult  HCP: friend Leoncio Merchant; provided number not working.   consult to assist with locating his contact    contact: TEAMS.

## 2024-04-18 NOTE — ADVANCED PRACTICE NURSE CONSULT - RECOMMEDATIONS
Impression   fecal incontinence  urinary incontinence  xiao rectal incontinence associated dermatitis     bilateral sacrum/coccyx / buttocks deep tissue injury with evolution, present on admission  bilateral legs venous statis dermatitis dry scaly skin   bilateral feet/heels refer to podiatry     Recommendations   1.  bilateral sacrum/coccyx / buttock deep tissue injury with evolution     xiao rectal incontinence dermatitis     Topical therapy- sacral/bilateral buttocks- cleanse w/incontinent cleanser, pat dry & apply TRAID   twice daily & prn soiling . Monitor  for changes .  2. bilateral feet/heels lower legs follow podiatry management   Elevate heels; apply Complete Cair air fluidized boots; ensure that the soles of the feet are not resting on the foot board of the bed.  3  Incontinent management - incontinent cleanser, pads,  xiao care  BID  4. Maintain on an alternating air with low air loss surface   5. Turn & reposition every 2 hr; Use positioning pillow to turn and reposition, soft pillow between bony prominences; continue measures to decrease friction/shear/pressure.  6. Nutrition optimization.  7.  Place waffle cushion when out of bed to chair .   8. bilateral lower extremity scaly skin would consider to apply  Lac -Hydrin cream  12% bid to bilateral lower extremity.   plan of care reviewed with covering staff RN Impression   fecal incontinence  urinary incontinence  xiao rectal incontinence associated dermatitis     bilateral sacrum/coccyx / buttocks deep tissue injury with evolution, present on admission  severe bilateral lower extremity lymphedema with scaly skin ventral and dorsally.   bilateral feet/heels refer to podiatry     Recommendations   1.  bilateral sacrum/coccyx / buttock deep tissue injury with evolution     xiao rectal incontinence dermatitis     Topical therapy- sacral/bilateral buttocks- cleanse w/incontinent cleanser, pat dry & apply TRAID   twice daily & prn soiling . Monitor  for changes .  2. bilateral feet/heels lower legs follow podiatry management   Elevate heels; apply Complete Cair air fluidized boots; ensure that the soles of the feet are not resting on the foot board of the bed.  3  Incontinent management - incontinent cleanser, pads,  xiao care  BID  4. Maintain on an alternating air with low air loss surface   5. Turn & reposition every 2 hr; Use positioning pillow to turn and reposition, soft pillow between bony prominences; continue measures to decrease friction/shear/pressure.  6. Nutrition optimization.  7.  Place waffle cushion when out of bed to chair .   8. bilateral lower extremity scaly skin cleans area pat dry then would consider to apply  Lac -Hydrin cream  12% bid to bilateral lower extremity.   plan of care reviewed with covering staff Sangeeta Devi (FARHEEN)

## 2024-04-18 NOTE — DIETITIAN INITIAL EVALUATION ADULT - ENERGY INTAKE
In house, pt reports appetite and PO intake slowly improving. Obtained food preferences, RD to honor as able.  Fair (50-75%)

## 2024-04-18 NOTE — DIETITIAN INITIAL EVALUATION ADULT - ORAL INTAKE PTA/DIET HISTORY
Pt reports having a decreased appetite and PO intake x 2-3 weeks PTA; reports consuming small portions of soup, no taste for anything. Follows regular diet; no therapeutic restrictions reported. Pt denies any known food allergies or intolerances. No micronutrient supplementation PTA. Denies any difficulty chewing/swallowing at this time.

## 2024-04-19 ENCOUNTER — RESULT REVIEW (OUTPATIENT)
Age: 84
End: 2024-04-19

## 2024-04-19 LAB
ALBUMIN SERPL ELPH-MCNC: 2.5 G/DL — LOW (ref 3.3–5)
ALP SERPL-CCNC: 60 U/L — SIGNIFICANT CHANGE UP (ref 40–120)
ALT FLD-CCNC: 7 U/L — LOW (ref 10–45)
ANION GAP SERPL CALC-SCNC: 8 MMOL/L — SIGNIFICANT CHANGE UP (ref 5–17)
AST SERPL-CCNC: 7 U/L — LOW (ref 10–40)
BASOPHILS # BLD AUTO: 0.02 K/UL — SIGNIFICANT CHANGE UP (ref 0–0.2)
BASOPHILS NFR BLD AUTO: 0.4 % — SIGNIFICANT CHANGE UP (ref 0–2)
BILIRUB SERPL-MCNC: 0.7 MG/DL — SIGNIFICANT CHANGE UP (ref 0.2–1.2)
BUN SERPL-MCNC: <4 MG/DL — LOW (ref 7–23)
CALCIUM SERPL-MCNC: 8.5 MG/DL — SIGNIFICANT CHANGE UP (ref 8.4–10.5)
CHLORIDE SERPL-SCNC: 103 MMOL/L — SIGNIFICANT CHANGE UP (ref 96–108)
CO2 SERPL-SCNC: 27 MMOL/L — SIGNIFICANT CHANGE UP (ref 22–31)
CREAT SERPL-MCNC: 0.32 MG/DL — LOW (ref 0.5–1.3)
EGFR: 117 ML/MIN/1.73M2 — SIGNIFICANT CHANGE UP
EOSINOPHIL # BLD AUTO: 0.66 K/UL — HIGH (ref 0–0.5)
EOSINOPHIL NFR BLD AUTO: 11.6 % — HIGH (ref 0–6)
GLUCOSE SERPL-MCNC: 109 MG/DL — HIGH (ref 70–99)
HCT VFR BLD CALC: 27.2 % — LOW (ref 39–50)
HGB BLD-MCNC: 9.2 G/DL — LOW (ref 13–17)
IMM GRANULOCYTES NFR BLD AUTO: 0.5 % — SIGNIFICANT CHANGE UP (ref 0–0.9)
LYMPHOCYTES # BLD AUTO: 1.06 K/UL — SIGNIFICANT CHANGE UP (ref 1–3.3)
LYMPHOCYTES # BLD AUTO: 18.6 % — SIGNIFICANT CHANGE UP (ref 13–44)
MAGNESIUM SERPL-MCNC: 1.6 MG/DL — SIGNIFICANT CHANGE UP (ref 1.6–2.6)
MCHC RBC-ENTMCNC: 31.8 PG — SIGNIFICANT CHANGE UP (ref 27–34)
MCHC RBC-ENTMCNC: 33.8 GM/DL — SIGNIFICANT CHANGE UP (ref 32–36)
MCV RBC AUTO: 94.1 FL — SIGNIFICANT CHANGE UP (ref 80–100)
MONOCYTES # BLD AUTO: 0.42 K/UL — SIGNIFICANT CHANGE UP (ref 0–0.9)
MONOCYTES NFR BLD AUTO: 7.4 % — SIGNIFICANT CHANGE UP (ref 2–14)
NEUTROPHILS # BLD AUTO: 3.51 K/UL — SIGNIFICANT CHANGE UP (ref 1.8–7.4)
NEUTROPHILS NFR BLD AUTO: 61.5 % — SIGNIFICANT CHANGE UP (ref 43–77)
NRBC # BLD: 0 /100 WBCS — SIGNIFICANT CHANGE UP (ref 0–0)
PHOSPHATE SERPL-MCNC: 2.6 MG/DL — SIGNIFICANT CHANGE UP (ref 2.5–4.5)
PLATELET # BLD AUTO: 219 K/UL — SIGNIFICANT CHANGE UP (ref 150–400)
POTASSIUM SERPL-MCNC: 3.8 MMOL/L — SIGNIFICANT CHANGE UP (ref 3.5–5.3)
POTASSIUM SERPL-SCNC: 3.8 MMOL/L — SIGNIFICANT CHANGE UP (ref 3.5–5.3)
PROT SERPL-MCNC: 6.7 G/DL — SIGNIFICANT CHANGE UP (ref 6–8.3)
RBC # BLD: 2.89 M/UL — LOW (ref 4.2–5.8)
RBC # FLD: 19.1 % — HIGH (ref 10.3–14.5)
SODIUM SERPL-SCNC: 138 MMOL/L — SIGNIFICANT CHANGE UP (ref 135–145)
WBC # BLD: 5.7 K/UL — SIGNIFICANT CHANGE UP (ref 3.8–10.5)
WBC # FLD AUTO: 5.7 K/UL — SIGNIFICANT CHANGE UP (ref 3.8–10.5)

## 2024-04-19 PROCEDURE — 99232 SBSQ HOSP IP/OBS MODERATE 35: CPT | Mod: GC

## 2024-04-19 PROCEDURE — 76376 3D RENDER W/INTRP POSTPROCES: CPT | Mod: 26

## 2024-04-19 PROCEDURE — 99232 SBSQ HOSP IP/OBS MODERATE 35: CPT

## 2024-04-19 PROCEDURE — 93306 TTE W/DOPPLER COMPLETE: CPT | Mod: 26

## 2024-04-19 RX ORDER — OXYCODONE HYDROCHLORIDE 5 MG/1
2.5 TABLET ORAL EVERY 6 HOURS
Refills: 0 | Status: DISCONTINUED | OUTPATIENT
Start: 2024-04-19 | End: 2024-04-26

## 2024-04-19 RX ORDER — MAGNESIUM SULFATE 500 MG/ML
2 VIAL (ML) INJECTION ONCE
Refills: 0 | Status: DISCONTINUED | OUTPATIENT
Start: 2024-04-19 | End: 2024-04-19

## 2024-04-19 RX ORDER — IRON SUCROSE 20 MG/ML
100 INJECTION, SOLUTION INTRAVENOUS EVERY 24 HOURS
Refills: 0 | Status: DISCONTINUED | OUTPATIENT
Start: 2024-04-19 | End: 2024-04-19

## 2024-04-19 RX ORDER — OXYCODONE HYDROCHLORIDE 5 MG/1
5 TABLET ORAL EVERY 6 HOURS
Refills: 0 | Status: DISCONTINUED | OUTPATIENT
Start: 2024-04-19 | End: 2024-04-26

## 2024-04-19 RX ORDER — FERROUS SULFATE 325(65) MG
325 TABLET ORAL DAILY
Refills: 0 | Status: DISCONTINUED | OUTPATIENT
Start: 2024-04-19 | End: 2024-04-26

## 2024-04-19 RX ORDER — MUPIROCIN 20 MG/G
1 OINTMENT TOPICAL
Refills: 0 | Status: COMPLETED | OUTPATIENT
Start: 2024-04-19 | End: 2024-04-23

## 2024-04-19 RX ADMIN — Medication 1 APPLICATION(S): at 11:16

## 2024-04-19 RX ADMIN — MUPIROCIN 1 APPLICATION(S): 20 OINTMENT TOPICAL at 06:14

## 2024-04-19 RX ADMIN — GABAPENTIN 300 MILLIGRAM(S): 400 CAPSULE ORAL at 06:15

## 2024-04-19 RX ADMIN — PANTOPRAZOLE SODIUM 40 MILLIGRAM(S): 20 TABLET, DELAYED RELEASE ORAL at 06:16

## 2024-04-19 RX ADMIN — Medication 12.5 MILLIGRAM(S): at 06:15

## 2024-04-19 RX ADMIN — CHLORHEXIDINE GLUCONATE 1 APPLICATION(S): 213 SOLUTION TOPICAL at 21:03

## 2024-04-19 RX ADMIN — Medication 650 MILLIGRAM(S): at 07:10

## 2024-04-19 RX ADMIN — MUPIROCIN 1 APPLICATION(S): 20 OINTMENT TOPICAL at 18:07

## 2024-04-19 RX ADMIN — GABAPENTIN 300 MILLIGRAM(S): 400 CAPSULE ORAL at 21:03

## 2024-04-19 RX ADMIN — GABAPENTIN 300 MILLIGRAM(S): 400 CAPSULE ORAL at 13:11

## 2024-04-19 RX ADMIN — HEPARIN SODIUM 5000 UNIT(S): 5000 INJECTION INTRAVENOUS; SUBCUTANEOUS at 06:16

## 2024-04-19 RX ADMIN — Medication 650 MILLIGRAM(S): at 06:12

## 2024-04-19 RX ADMIN — OXYCODONE HYDROCHLORIDE 2.5 MILLIGRAM(S): 5 TABLET ORAL at 16:40

## 2024-04-19 RX ADMIN — Medication 12.5 MILLIGRAM(S): at 18:07

## 2024-04-19 RX ADMIN — OXYCODONE HYDROCHLORIDE 2.5 MILLIGRAM(S): 5 TABLET ORAL at 00:35

## 2024-04-19 RX ADMIN — OXYCODONE HYDROCHLORIDE 2.5 MILLIGRAM(S): 5 TABLET ORAL at 17:58

## 2024-04-19 RX ADMIN — HEPARIN SODIUM 5000 UNIT(S): 5000 INJECTION INTRAVENOUS; SUBCUTANEOUS at 18:07

## 2024-04-19 NOTE — PROGRESS NOTE ADULT - PROBLEM SELECTOR PLAN 2
Hg  9.8  (bL unclear, no prior in chart )   - Ddx: AoCD vs NORAH   - Check iron studies, b12, folate  - NORAH; oral iron

## 2024-04-19 NOTE — PROGRESS NOTE ADULT - SUBJECTIVE AND OBJECTIVE BOX
PROGRESS NOTE:   Authored by Dr. Juan Manuel Craig MD (PGY-1). Pager [facility] [phone] / [facility]     Patient is a 83y old  Male who presents with a chief complaint of Wound Check (19 Apr 2024 11:09)      SUBJECTIVE / OVERNIGHT EVENTS:  No acute events overnight.     ADDITIONAL REVIEW OF SYSTEMS:  Patient denies fevers, chills, chest pain, shortness of breath, nausea, abdominal pain, diarrhea, constipation, dysuria, leg swelling, headache, light headedness.    MEDICATIONS  (STANDING):  chlorhexidine 4% Liquid 1 Application(s) Topical daily  Dakins Solution - 1/2 Strength 1 Application(s) Topical daily  gabapentin 300 milliGRAM(s) Oral three times a day  heparin   Injectable 5000 Unit(s) SubCutaneous every 12 hours  iron sucrose Injectable 100 milliGRAM(s) IV Push every 24 hours  metoprolol tartrate 12.5 milliGRAM(s) Oral two times a day  mupirocin 2% Nasal 1 Application(s) Both Nostrils two times a day  pantoprazole    Tablet 40 milliGRAM(s) Oral before breakfast    MEDICATIONS  (PRN):  acetaminophen     Tablet .. 650 milliGRAM(s) Oral every 6 hours PRN Temp greater or equal to 38C (100.4F), Mild Pain (1 - 3)  aluminum hydroxide/magnesium hydroxide/simethicone Suspension 30 milliLiter(s) Oral every 4 hours PRN Dyspepsia  melatonin 3 milliGRAM(s) Oral at bedtime PRN Insomnia  ondansetron Injectable 4 milliGRAM(s) IV Push every 8 hours PRN Nausea and/or Vomiting  oxyCODONE    IR 2.5 milliGRAM(s) Oral every 6 hours PRN Moderate Pain (4 - 6)  oxyCODONE    IR 5 milliGRAM(s) Oral every 6 hours PRN Severe Pain (7 - 10)      CAPILLARY BLOOD GLUCOSE        I&O's Summary    18 Apr 2024 07:01  -  19 Apr 2024 07:00  --------------------------------------------------------  IN: 0 mL / OUT: 400 mL / NET: -400 mL        PHYSICAL EXAM:  Vital Signs Last 24 Hrs  T(C): 36.7 (18 Apr 2024 21:29), Max: 36.8 (18 Apr 2024 14:51)  T(F): 98 (18 Apr 2024 21:29), Max: 98.2 (18 Apr 2024 14:51)  HR: 90 (18 Apr 2024 21:29) (87 - 99)  BP: 126/75 (18 Apr 2024 21:29) (110/68 - 126/75)  BP(mean): --  RR: 18 (18 Apr 2024 21:29) (18 - 18)  SpO2: 100% (18 Apr 2024 21:29) (97% - 100%)    Parameters below as of 18 Apr 2024 14:51  Patient On (Oxygen Delivery Method): room air    CONSTITUTIONAL: no apparent distress  EYES: PERRLA , EOMI  ENMT: MMM. Normal dentition  RESP: No respiratory distress, CTA b/l  CV: +S1S2, no MRG; 3+ b/L LE edema iso lymphedema   GI: Soft, NTND, no RGR  MSK: contracted LE w/ pronounced swelling; lichenified skin  SKIN: dry scaly, swollen b/l LE lymphedema   NEURO: CN II-XII grossly intact; normal reflexes  PSYCH: A+O x 3    LABS:                        9.2    5.70  )-----------( 219      ( 19 Apr 2024 10:29 )             27.2     04-19    138  |  103  |  <4<L>  ----------------------------<  109<H>  3.8   |  27  |  0.32<L>    Ca    8.5      19 Apr 2024 10:29  Phos  2.6     04-19  Mg     1.6     04-19    TPro  6.7  /  Alb  2.5<L>  /  TBili  0.7  /  DBili  x   /  AST  7<L>  /  ALT  7<L>  /  AlkPhos  60  04-19          Urinalysis Basic - ( 19 Apr 2024 10:29 )    Color: x / Appearance: x / SG: x / pH: x  Gluc: 109 mg/dL / Ketone: x  / Bili: x / Urobili: x   Blood: x / Protein: x / Nitrite: x   Leuk Esterase: x / RBC: x / WBC x   Sq Epi: x / Non Sq Epi: x / Bacteria: x        Culture - Abscess with Gram Stain (collected 16 Apr 2024 21:05)  Source: .Abscess right foot wound culture  Gram Stain (17 Apr 2024 05:56):    Rare polymorphonuclear leukocytes seen per low power field    Numerous Gram positive cocci in pairs seen per oil power field    Numerous Gram Variable Rods seen per oil power field  Preliminary Report (18 Apr 2024 14:18):    Culture yields >4 types of aerobic and/or anaerobic bacteria    Call client services within 7 days if further workup is clinically    indicated.    Culture - Blood (collected 16 Apr 2024 17:31)  Source: .Blood Blood-Peripheral  Preliminary Report (18 Apr 2024 23:01):    No growth at 48 Hours    Culture - Blood (collected 16 Apr 2024 17:16)  Source: .Blood Blood-Peripheral  Preliminary Report (18 Apr 2024 23:01):    No growth at 48 Hours        Tele Reviewed:    RADIOLOGY & ADDITIONAL TESTS:  Results Reviewed:   Imaging Personally Reviewed:  Electrocardiogram Personally Reviewed:

## 2024-04-19 NOTE — PROGRESS NOTE ADULT - ATTENDING COMMENTS
Doubt sig arterial disease  If we can get arterial duplex, would be helpful  local wound care and compression for now  podiatry followup    Amanda

## 2024-04-19 NOTE — PROGRESS NOTE ADULT - ASSESSMENT
83M presents w BL LE lymphedema  - Patient seen and evaluated  - Afebrile, no elukocytosis, ESR 91, CRP 3.6  - Severe bilateral lower extremity lymphedema with scaly skin dorsally and plantarly. right heel wound w exposed calcaneus, fibroganular woudn bed  measuring 8x 8 cm , scant serosanguinous drainage, no purulence, no erythema, left heel wound fibronecrotic wound bed probing to bone measuring 8x 5cm, scant serosanguinous drainage no purulence.  left wound 5th styloid wound measuring 2x2 cm with graft and staples kept intact, non-boggy, no acute signs of infection B/L.   - Right foot wound culture: gram positive cocci, gram negative rods   - Bilateral foot foot xrays: Age-indeterminate erosion of the fifth metatarsal base, no gas  - SUKH/PVR: RABI 1.39, LABI 1.29  - vascular recs appreciated  - Medical clearance for possible podiatry procedure appreciated  - Pod plan local wound care vs bilateral wounds debridement pending Vascular recommendations/ imaging  - Seen with attending

## 2024-04-19 NOTE — PROGRESS NOTE ADULT - ATTENDING COMMENTS
84yo M pmh HTN, chronic LE lymphedema, wheelchair bound with b/l leg contractures after falling from ladder years ago, presents 4/16 for concern of chronic osteomyelitis, pending MRI if feasible with his contractures, not septic, monitoring off abx, pending vascular/podiatry plan for management.    1: possible chronic osteomyelitis:  Afebrile, AVSS, normal wbc.  CRP elevated at 36.  Xray Foot AP + Lateral, Right: There is no fracture or dislocation. There is Charcot configuration of the foot/ankle. Age-indeterminate erosion of the fifth metatarsal base, for which correlation for acute osteomyelitis recommended. No visualized soft tissue gas.  Xray Foot AP + Lateral + Oblique, Left: Plantar calcaneal and possibly plantar 5th metatarsal base osteomyelitis.   -per patient he recently was on abx stopped ~8 days ago, and had recent procedure to his feet at OSH.  No records available in [facility].  -appreciate podiatry consult:  -severe bilateral lower extremity lymphedema with scaly skin dorsally and plantarly. right heel wound with exposed calcaneus measuring 8x 8 cm , with scant serosanguinous drainage, no purulence no erythema, left heel wound fibronecrotic wound bed probing to bed measuring 8x 5cm, scant serosanguinous drainage no purulence.  left wound 5th styloid wound measuring 2x2 cm with graft and staples intact.   - Right foot wound culture pending: gram + cocci in pairs   - Bilateral foot foot xrays: Age-indeterminate erosion of the fifth metatarsal base, no gas  -  SUKH/PVR:  The right SUKH of 1.39 and the left SUKH of 1.29 are normal. The reduced right toe-brachial index of 0.50,and the reduced amplitude   of the photoplethysmography at the right great toe is suggestive of disease affecting the small arteries of the right foot.  - Pod plan local wound care vs bilateral wounds debridement pending imaging /vasc recs  -f/u vascular recs: Dr Patel  -as he is hemodynamically stable and not septic will hold abx for now to allow for better cultures with any procedure planned  -lipids: TC 98/ HDL 34/ LDL 53/ TG 46  -a1c 5.8  -TTE pending  -f/u MRI (unclear if able to get due to his contractures)  -f/u podiatry plan    2.Anemia: MCV 93.8  unclear baseline  possible mixed picture with AOCD and iron deficiency based on labs.  Giving iv iron for 5 days    3. HTN: C/w metoprolol  - Hold lasix give mild hypoNa  - Trend labs.    4.PPX: sqh  PT consult  HCP: friend Leoncio Merchant; provided number not working.  Palliative care consult to assist with GOC and HCP as patient is estranged from family and we have been unable to reach his stated proxy.      contact: TEAMS.

## 2024-04-19 NOTE — PROGRESS NOTE ADULT - SUBJECTIVE AND OBJECTIVE BOX
Patient is a 83y old  Male who presents with a chief complaint of Wound Check (19 Apr 2024 11:09)       INTERVAL HPI/OVERNIGHT EVENTS:  Patient seen and evaluated at bedside.  Pt is resting comfortable in NAD. Denies N/V/F/C.    Allergies    No Known Allergies    Intolerances        Vital Signs Last 24 Hrs  T(C): 36.7 (18 Apr 2024 21:29), Max: 36.8 (18 Apr 2024 14:51)  T(F): 98 (18 Apr 2024 21:29), Max: 98.2 (18 Apr 2024 14:51)  HR: 90 (18 Apr 2024 21:29) (87 - 99)  BP: 126/75 (18 Apr 2024 21:29) (110/68 - 126/75)  BP(mean): --  RR: 18 (18 Apr 2024 21:29) (18 - 18)  SpO2: 100% (18 Apr 2024 21:29) (97% - 100%)    Parameters below as of 18 Apr 2024 14:51  Patient On (Oxygen Delivery Method): room air        LABS:                        9.2    5.70  )-----------( 219      ( 19 Apr 2024 10:29 )             27.2     04-19    138  |  103  |  <4<L>  ----------------------------<  109<H>  3.8   |  27  |  0.32<L>    Ca    8.5      19 Apr 2024 10:29  Phos  2.6     04-19  Mg     1.6     04-19    TPro  6.7  /  Alb  2.5<L>  /  TBili  0.7  /  DBili  x   /  AST  7<L>  /  ALT  7<L>  /  AlkPhos  60  04-19      Urinalysis Basic - ( 19 Apr 2024 10:29 )    Color: x / Appearance: x / SG: x / pH: x  Gluc: 109 mg/dL / Ketone: x  / Bili: x / Urobili: x   Blood: x / Protein: x / Nitrite: x   Leuk Esterase: x / RBC: x / WBC x   Sq Epi: x / Non Sq Epi: x / Bacteria: x      CAPILLARY BLOOD GLUCOSE          Lower Extremity Physical Exam:  Vascular: DP/PT 0/4 2/2 to edema B/L, CFT <4 seconds B/L, Temperature gradient warm to cool, B/L.   Neuro: Epicritic sensation intact to the level of toes, B/L.  Musculoskeletal/Ortho: contracted  Skin: Severe bilateral lower extremity lymphedema with scaly skin dorsally and plantarly. right heel wound w exposed calcaneus, fibroganular woudn bed  measuring 8x 8 cm , scant serosanguinous drainage, no purulence, no erythema, left heel wound fibronecrotic wound bed probing to bone measuring 8x 5cm, scant serosanguinous drainage no purulence.  left wound 5th styloid wound measuring 2x2 cm with graft and staples kept intact, non-boggy, no acute signs of infection B/L.     RADIOLOGY & ADDITIONAL TESTS:

## 2024-04-19 NOTE — PROGRESS NOTE ADULT - SUBJECTIVE AND OBJECTIVE BOX
Patient seen and examined at bedside.    Overnight Events: No acute events overnight.       REVIEW OF SYSTEMS:  Constitutional:     [ x] negative [ ] fevers [ ] chills [ ] weight loss [ ] weight gain  HEENT:                  [ x] negative [ ] dry eyes [ ] eye irritation [ ] postnasal drip [ ] nasal congestion  CV:                         [x ] negative  [ ] chest pain [ ] orthopnea [ ] palpitations [ ] murmur  Resp:                     [ x] negative [ ] cough [ ] shortness of breath [ ] dyspnea [ ] wheezing [ ] sputum [ ]hemoptysis  GI:                          [ x] negative [ ] nausea [ ] vomiting [ ] diarrhea [ ] constipation [ ] abd pain [ ] dysphagia   :                        [ x] negative [ ] dysuria [ ] nocturia [ ] hematuria [ ] increased urinary frequency  Musculoskeletal: [ x] negative [ ] back pain [ ] myalgias [ ] arthralgias [ ] fracture  Skin:                       [ x] negative [ ] rash [ ] itch  Neurological:        [ x] negative [ ] headache [ ] dizziness [ ] syncope [ ] weakness [ ] numbness  Psychiatric:           [ x] negative [ ] anxiety [ ] depression  Endocrine:            [ x] negative [ ] diabetes [ ] thyroid problem  Heme/Lymph:      [ x] negative [ ] anemia [ ] bleeding problem  Allergic/Immune: [x ] negative [ ] itchy eyes [ ] nasal discharge [ ] hives [ ] angioedema    [x ] All other systems negative  [ ] Unable to assess ROS due to    Current Meds:  acetaminophen     Tablet .. 650 milliGRAM(s) Oral every 6 hours PRN  aluminum hydroxide/magnesium hydroxide/simethicone Suspension 30 milliLiter(s) Oral every 4 hours PRN  chlorhexidine 4% Liquid 1 Application(s) Topical daily  Dakins Solution - 1/2 Strength 1 Application(s) Topical daily  gabapentin 300 milliGRAM(s) Oral three times a day  heparin   Injectable 5000 Unit(s) SubCutaneous every 12 hours  iron sucrose Injectable 100 milliGRAM(s) IV Push every 24 hours  melatonin 3 milliGRAM(s) Oral at bedtime PRN  metoprolol tartrate 12.5 milliGRAM(s) Oral two times a day  mupirocin 2% Nasal 1 Application(s) Both Nostrils two times a day  ondansetron Injectable 4 milliGRAM(s) IV Push every 8 hours PRN  oxyCODONE    IR 2.5 milliGRAM(s) Oral every 6 hours PRN  oxyCODONE    IR 5 milliGRAM(s) Oral every 6 hours PRN  pantoprazole    Tablet 40 milliGRAM(s) Oral before breakfast      PAST MEDICAL & SURGICAL HISTORY:  HTN (hypertension)      HLD (hyperlipidemia)      Lymphedema          Vitals:  T(F): 98 (04-18), Max: 98.2 (04-18)  HR: 90 (04-18) (87 - 99)  BP: 126/75 (04-18) (110/68 - 126/75)  RR: 18 (04-18)  SpO2: 100% (04-18)  I&O's Summary    18 Apr 2024 07:01  -  19 Apr 2024 07:00  --------------------------------------------------------  IN: 0 mL / OUT: 400 mL / NET: -400 mL        Physical Exam:  GENERAL: No acute distress, well-developed  HEAD:  Atraumatic, Normocephalic  ENT: EOMI, PERRLA, conjunctiva and sclera clear, Neck supple, No JVD, moist mucosa  CHEST/LUNG: Clear to auscultation bilaterally; No wheeze, equal breath sounds bilaterally   BACK: No spinal tenderness  HEART: Regular rate and rhythm; No murmurs, rubs, or gallops  ABDOMEN: Soft, Nontender, Nondistended; Bowel sounds present  EXTREMITIES: Significant B/L LE edema with + stemmer sign, dry/scaly skin. Bilateral heel Wounds with scant drainage.  left wound 5th styloid wound measuring with graft and staples kept intact, wrapped bilat- Pulses not palpable 2/2 significant edema  PSYCH: Nl behavior, nl affect  NEUROLOGY: AAOx3, non-focal, cranial nerves intact  SKIN: Normal color, No rashes or lesions                        9.2    5.70  )-----------( 219      ( 19 Apr 2024 10:29 )             27.2     04-18    139  |  106  |  6<L>  ----------------------------<  128<H>  3.6   |  27  |  0.36<L>    Ca    9.0      18 Apr 2024 09:40  Phos  2.2     04-18  Mg     1.5     04-18

## 2024-04-19 NOTE — PROGRESS NOTE ADULT - ASSESSMENT
84 YO Man with  Mhx of htn, chronic LE lymphedema with concern for possible chronic osteo,  presents for eval of foot wounds. Vascular cardiology consulted for abnormal Toe Brachial Index.     # Abnormal Toe Brachial Index  -right  toe-brachial index equals 0.50 which is mildy abnormal, and likely related to the lymphedema. There is reasonable pulsatility  -f/u venous duplex.   -f/u Arterial  Duplex of B/L LE extremities (legs should be moisturized and wrapped, discussed with patient, he is hesitant at present time, but will try)  -appreciate  wound care consult      Fred Nichols, Cardiology Fellow, F-2    For all New Consults and Questions:  [email or web address]   Login: [email or web address]    *** Note not final until signed by attending

## 2024-04-20 PROCEDURE — 73720 MRI LWR EXTREMITY W/O&W/DYE: CPT | Mod: 26,RT

## 2024-04-20 PROCEDURE — 99233 SBSQ HOSP IP/OBS HIGH 50: CPT | Mod: GC

## 2024-04-20 RX ADMIN — OXYCODONE HYDROCHLORIDE 2.5 MILLIGRAM(S): 5 TABLET ORAL at 13:04

## 2024-04-20 RX ADMIN — HEPARIN SODIUM 5000 UNIT(S): 5000 INJECTION INTRAVENOUS; SUBCUTANEOUS at 05:37

## 2024-04-20 RX ADMIN — Medication 650 MILLIGRAM(S): at 01:55

## 2024-04-20 RX ADMIN — Medication 1 APPLICATION(S): at 13:04

## 2024-04-20 RX ADMIN — GABAPENTIN 300 MILLIGRAM(S): 400 CAPSULE ORAL at 05:37

## 2024-04-20 RX ADMIN — GABAPENTIN 300 MILLIGRAM(S): 400 CAPSULE ORAL at 13:04

## 2024-04-20 RX ADMIN — GABAPENTIN 300 MILLIGRAM(S): 400 CAPSULE ORAL at 21:26

## 2024-04-20 RX ADMIN — Medication 325 MILLIGRAM(S): at 13:04

## 2024-04-20 RX ADMIN — OXYCODONE HYDROCHLORIDE 5 MILLIGRAM(S): 5 TABLET ORAL at 21:25

## 2024-04-20 RX ADMIN — Medication 12.5 MILLIGRAM(S): at 17:05

## 2024-04-20 RX ADMIN — PANTOPRAZOLE SODIUM 40 MILLIGRAM(S): 20 TABLET, DELAYED RELEASE ORAL at 05:37

## 2024-04-20 RX ADMIN — Medication 650 MILLIGRAM(S): at 02:35

## 2024-04-20 RX ADMIN — MUPIROCIN 1 APPLICATION(S): 20 OINTMENT TOPICAL at 17:04

## 2024-04-20 RX ADMIN — MUPIROCIN 1 APPLICATION(S): 20 OINTMENT TOPICAL at 05:36

## 2024-04-20 RX ADMIN — OXYCODONE HYDROCHLORIDE 2.5 MILLIGRAM(S): 5 TABLET ORAL at 14:36

## 2024-04-20 RX ADMIN — Medication 12.5 MILLIGRAM(S): at 05:37

## 2024-04-20 RX ADMIN — OXYCODONE HYDROCHLORIDE 5 MILLIGRAM(S): 5 TABLET ORAL at 21:55

## 2024-04-20 NOTE — PROGRESS NOTE ADULT - ATTENDING COMMENTS
Artemio is only here this week.  Received a call to schedule his annual visit.  Was informed that Dr. Laureano is not here this week.  Would like to get the blood work done.    Please call Artemio at [phone].    Thank you.   82yo M pmh HTN, chronic LE lymphedema, wheelchair bound with b/l leg contractures after falling from ladder years ago, presents 4/16 for concern of chronic osteomyelitis, pending MRI if feasible with his contractures, not septic, monitoring off abx, pending vascular/podiatry plan for management.    1: possible chronic osteomyelitis:  Afebrile, AVSS, normal wbc.  CRP elevated at 36.  Xray Foot AP + Lateral, Right: There is no fracture or dislocation. There is Charcot configuration of the foot/ankle. Age-indeterminate erosion of the fifth metatarsal base, for which correlation for acute osteomyelitis recommended. No visualized soft tissue gas.  Xray Foot AP + Lateral + Oblique, Left: Plantar calcaneal and possibly plantar 5th metatarsal base osteomyelitis.   -per patient he recently was on abx stopped ~8 days ago, and had recent procedure to his feet at OSH.  No records available in [facility].  -appreciate podiatry consult:  -severe bilateral lower extremity lymphedema with scaly skin dorsally and plantarly. right heel wound with exposed calcaneus measuring 8x 8 cm , with scant serosanguinous drainage, no purulence no erythema, left heel wound fibronecrotic wound bed probing to bed measuring 8x 5cm, scant serosanguinous drainage no purulence.  left wound 5th styloid wound measuring 2x2 cm with graft and staples intact.   - Right foot wound culture pending: gram + cocci in pairs   - Bilateral foot foot xrays: Age-indeterminate erosion of the fifth metatarsal base, no gas  -  SUKH/PVR:  The right SUKH of 1.39 and the left SUKH of 1.29 are normal. The reduced right toe-brachial index of 0.50,and the reduced amplitude   of the photoplethysmography at the right great toe is suggestive of disease affecting the small arteries of the right foot.  - Pod plan local wound care vs bilateral wounds debridement pending imaging /vasc recs  -f/u vascular cards - -f/u venous duplex, Arterial Duplex of B/L LE extremities   -as he is hemodynamically stable and not septic will hold abx for now to allow for better cultures with any procedure planned  -lipids: TC 98/ HDL 34/ LDL 53/ TG 46  -a1c 5.8  -TTE pending  -f/u MRI (unclear if able to get due to his contractures)  -f/u podiatry plan    2.Anemia: MCV 93.8  unclear baseline  possible mixed picture with AOCD and iron deficiency based on labs.  Giving iv iron for 5 days    3. HTN: C/w metoprolol  - Hold lasix give mild hypoNa  - Trend labs.    4.PPX: sqh  PT consult  HCP: friend Leoncio Merchant; provided number not working.  Palliative care consult to assist with GOC and HCP as patient is estranged from family and we have been unable to reach his stated proxy.

## 2024-04-20 NOTE — PROGRESS NOTE ADULT - SUBJECTIVE AND OBJECTIVE BOX
PROGRESS NOTE:   Authored by Dr. Mayank Henley  Patient is a 83y old  Male who presents with a chief complaint of Wound Check (19 Apr 2024 14:06)      SUBJECTIVE / OVERNIGHT EVENTS:    MEDICATIONS  (STANDING):  chlorhexidine 4% Liquid 1 Application(s) Topical daily  Dakins Solution - 1/2 Strength 1 Application(s) Topical daily  ferrous    sulfate 325 milliGRAM(s) Oral daily  gabapentin 300 milliGRAM(s) Oral three times a day  heparin   Injectable 5000 Unit(s) SubCutaneous every 12 hours  metoprolol tartrate 12.5 milliGRAM(s) Oral two times a day  mupirocin 2% Nasal 1 Application(s) Both Nostrils two times a day  pantoprazole    Tablet 40 milliGRAM(s) Oral before breakfast    MEDICATIONS  (PRN):  acetaminophen     Tablet .. 650 milliGRAM(s) Oral every 6 hours PRN Temp greater or equal to 38C (100.4F), Mild Pain (1 - 3)  aluminum hydroxide/magnesium hydroxide/simethicone Suspension 30 milliLiter(s) Oral every 4 hours PRN Dyspepsia  melatonin 3 milliGRAM(s) Oral at bedtime PRN Insomnia  ondansetron Injectable 4 milliGRAM(s) IV Push every 8 hours PRN Nausea and/or Vomiting  oxyCODONE    IR 2.5 milliGRAM(s) Oral every 6 hours PRN Moderate Pain (4 - 6)  oxyCODONE    IR 5 milliGRAM(s) Oral every 6 hours PRN Severe Pain (7 - 10)      CAPILLARY BLOOD GLUCOSE        I&O's Summary    19 Apr 2024 07:01  -  20 Apr 2024 07:00  --------------------------------------------------------  IN: 0 mL / OUT: 300 mL / NET: -300 mL        PHYSICAL EXAM:  Vital Signs Last 24 Hrs  T(C): 36.9 (20 Apr 2024 04:09), Max: 36.9 (20 Apr 2024 04:09)  T(F): 98.4 (20 Apr 2024 04:09), Max: 98.4 (20 Apr 2024 04:09)  HR: 69 (20 Apr 2024 04:09) (69 - 90)  BP: 110/72 (20 Apr 2024 04:09) (110/72 - 129/71)  BP(mean): --  RR: 18 (20 Apr 2024 04:09) (18 - 18)  SpO2: 98% (20 Apr 2024 04:09) (97% - 98%)    Parameters below as of 20 Apr 2024 04:09  Patient On (Oxygen Delivery Method): room air        GENERAL: NAD, lying comfortably in bed  HEAD: Atraumatic, normocephalic  EYES: EOMI b/l PERRLA b/l, conjunctiva and sclera clear  NECK: Supple, No JVD, No LAD  RESPIRATORY: Normal respiratory effort; lungs are clear to auscultation bilaterally  CARDIOVASCULAR: Regular rate and rhythm, normal S1 and S2, no murmur/rub/gallop; No lower extremity edema  ABDOMEN: Nontender, normoactive bowel sounds, no rebound/guarding; No hepatosplenomegaly  MUSCULOSKELETAL: no clubbing or cyanosis of digits; no joint swelling or tenderness to palpation  NEURO: Non focal   PSYCH: A+O to person, place, and time; affect appropriate     PROGRESS NOTE:   Authored by Dr. Mayank Henley  Patient is a 83y old  Male who presents with a chief complaint of Wound Check (19 Apr 2024 14:06)      SUBJECTIVE / OVERNIGHT EVENTS:  complained of right foot pain, requesting oxy, otherwise no events.     MEDICATIONS  (STANDING):  chlorhexidine 4% Liquid 1 Application(s) Topical daily  Dakins Solution - 1/2 Strength 1 Application(s) Topical daily  ferrous    sulfate 325 milliGRAM(s) Oral daily  gabapentin 300 milliGRAM(s) Oral three times a day  heparin   Injectable 5000 Unit(s) SubCutaneous every 12 hours  metoprolol tartrate 12.5 milliGRAM(s) Oral two times a day  mupirocin 2% Nasal 1 Application(s) Both Nostrils two times a day  pantoprazole    Tablet 40 milliGRAM(s) Oral before breakfast    MEDICATIONS  (PRN):  acetaminophen     Tablet .. 650 milliGRAM(s) Oral every 6 hours PRN Temp greater or equal to 38C (100.4F), Mild Pain (1 - 3)  aluminum hydroxide/magnesium hydroxide/simethicone Suspension 30 milliLiter(s) Oral every 4 hours PRN Dyspepsia  melatonin 3 milliGRAM(s) Oral at bedtime PRN Insomnia  ondansetron Injectable 4 milliGRAM(s) IV Push every 8 hours PRN Nausea and/or Vomiting  oxyCODONE    IR 2.5 milliGRAM(s) Oral every 6 hours PRN Moderate Pain (4 - 6)  oxyCODONE    IR 5 milliGRAM(s) Oral every 6 hours PRN Severe Pain (7 - 10)      CAPILLARY BLOOD GLUCOSE        I&O's Summary    19 Apr 2024 07:01  -  20 Apr 2024 07:00  --------------------------------------------------------  IN: 0 mL / OUT: 300 mL / NET: -300 mL        PHYSICAL EXAM:  Vital Signs Last 24 Hrs  T(C): 36.9 (20 Apr 2024 04:09), Max: 36.9 (20 Apr 2024 04:09)  T(F): 98.4 (20 Apr 2024 04:09), Max: 98.4 (20 Apr 2024 04:09)  HR: 69 (20 Apr 2024 04:09) (69 - 90)  BP: 110/72 (20 Apr 2024 04:09) (110/72 - 129/71)  BP(mean): --  RR: 18 (20 Apr 2024 04:09) (18 - 18)  SpO2: 98% (20 Apr 2024 04:09) (97% - 98%)    Parameters below as of 20 Apr 2024 04:09  Patient On (Oxygen Delivery Method): room air        GENERAL: NAD, lying comfortably in bed  HEAD: Atraumatic, normocephalic  EYES: EOMI b/l PERRLA b/l, conjunctiva and sclera clear  NECK: Supple, No JVD, No LAD  RESPIRATORY: Normal respiratory effort; lungs are clear to auscultation bilaterally  CARDIOVASCULAR: Regular rate and rhythm, normal S1 and S2, no murmur/rub/gallop; No lower extremity edema  ABDOMEN: Nontender, normoactive bowel sounds, no rebound/guarding; No hepatosplenomegaly  MUSCULOSKELETAL: bilateral lower extremity swelling, non tender.   NEURO: Non focal   PSYCH: A+O to person, place, and time; affect appropriate        LABS:                        9.2    5.70  )-----------( 219      ( 19 Apr 2024 10:29 )             27.2     04-19    138  |  103  |  <4<L>  ----------------------------<  109<H>  3.8   |  27  |  0.32<L>    Ca    8.5      19 Apr 2024 10:29  Phos  2.6     04-19  Mg     1.6     04-19    TPro  6.7  /  Alb  2.5<L>  /  TBili  0.7  /  DBili  x   /  AST  7<L>  /  ALT  7<L>  /  AlkPhos  60  04-19          Urinalysis Basic - ( 19 Apr 2024 10:29 )    Color: x / Appearance: x / SG: x / pH: x  Gluc: 109 mg/dL / Ketone: x  / Bili: x / Urobili: x   Blood: x / Protein: x / Nitrite: x   Leuk Esterase: x / RBC: x / WBC x   Sq Epi: x / Non Sq Epi: x / Bacteria: x

## 2024-04-21 LAB
-  AMOXICILLIN/CLAVULANIC ACID: SIGNIFICANT CHANGE UP
-  AMPICILLIN/SULBACTAM: SIGNIFICANT CHANGE UP
-  AMPICILLIN: SIGNIFICANT CHANGE UP
-  AMPICILLIN: SIGNIFICANT CHANGE UP
-  AZTREONAM: SIGNIFICANT CHANGE UP
-  CEFAZOLIN: SIGNIFICANT CHANGE UP
-  CEFEPIME: SIGNIFICANT CHANGE UP
-  CEFOXITIN: SIGNIFICANT CHANGE UP
-  CEFTRIAXONE: SIGNIFICANT CHANGE UP
-  CIPROFLOXACIN: SIGNIFICANT CHANGE UP
-  CLINDAMYCIN: SIGNIFICANT CHANGE UP
-  ERTAPENEM: SIGNIFICANT CHANGE UP
-  ERYTHROMYCIN: SIGNIFICANT CHANGE UP
-  GENTAMICIN: SIGNIFICANT CHANGE UP
-  GENTAMICIN: SIGNIFICANT CHANGE UP
-  LEVOFLOXACIN: SIGNIFICANT CHANGE UP
-  MEROPENEM: SIGNIFICANT CHANGE UP
-  OXACILLIN: SIGNIFICANT CHANGE UP
-  PENICILLIN: SIGNIFICANT CHANGE UP
-  PIPERACILLIN/TAZOBACTAM: SIGNIFICANT CHANGE UP
-  RIFAMPIN: SIGNIFICANT CHANGE UP
-  TETRACYCLINE: SIGNIFICANT CHANGE UP
-  TOBRAMYCIN: SIGNIFICANT CHANGE UP
-  TRIMETHOPRIM/SULFAMETHOXAZOLE: SIGNIFICANT CHANGE UP
-  TRIMETHOPRIM/SULFAMETHOXAZOLE: SIGNIFICANT CHANGE UP
-  VANCOMYCIN: SIGNIFICANT CHANGE UP
-  VANCOMYCIN: SIGNIFICANT CHANGE UP
CULTURE RESULTS: SIGNIFICANT CHANGE UP
CULTURE RESULTS: SIGNIFICANT CHANGE UP
METHOD TYPE: SIGNIFICANT CHANGE UP
SPECIMEN SOURCE: SIGNIFICANT CHANGE UP
SPECIMEN SOURCE: SIGNIFICANT CHANGE UP

## 2024-04-21 PROCEDURE — 99233 SBSQ HOSP IP/OBS HIGH 50: CPT | Mod: GC

## 2024-04-21 RX ADMIN — Medication 1 APPLICATION(S): at 12:33

## 2024-04-21 RX ADMIN — MUPIROCIN 1 APPLICATION(S): 20 OINTMENT TOPICAL at 18:11

## 2024-04-21 RX ADMIN — HEPARIN SODIUM 5000 UNIT(S): 5000 INJECTION INTRAVENOUS; SUBCUTANEOUS at 06:16

## 2024-04-21 RX ADMIN — Medication 325 MILLIGRAM(S): at 12:33

## 2024-04-21 RX ADMIN — PANTOPRAZOLE SODIUM 40 MILLIGRAM(S): 20 TABLET, DELAYED RELEASE ORAL at 06:17

## 2024-04-21 RX ADMIN — GABAPENTIN 300 MILLIGRAM(S): 400 CAPSULE ORAL at 15:32

## 2024-04-21 RX ADMIN — MUPIROCIN 1 APPLICATION(S): 20 OINTMENT TOPICAL at 06:17

## 2024-04-21 RX ADMIN — GABAPENTIN 300 MILLIGRAM(S): 400 CAPSULE ORAL at 06:17

## 2024-04-21 RX ADMIN — OXYCODONE HYDROCHLORIDE 5 MILLIGRAM(S): 5 TABLET ORAL at 06:20

## 2024-04-21 RX ADMIN — Medication 12.5 MILLIGRAM(S): at 06:16

## 2024-04-21 RX ADMIN — OXYCODONE HYDROCHLORIDE 5 MILLIGRAM(S): 5 TABLET ORAL at 18:13

## 2024-04-21 RX ADMIN — HEPARIN SODIUM 5000 UNIT(S): 5000 INJECTION INTRAVENOUS; SUBCUTANEOUS at 18:10

## 2024-04-21 RX ADMIN — Medication 12.5 MILLIGRAM(S): at 18:17

## 2024-04-21 RX ADMIN — GABAPENTIN 300 MILLIGRAM(S): 400 CAPSULE ORAL at 22:36

## 2024-04-21 NOTE — PROGRESS NOTE ADULT - SUBJECTIVE AND OBJECTIVE BOX
*******************************  Vilma Daniels MD (PGY-1)  Internal Medicine  Contact via [employer] TEAMS  *******************************    VIK AMBROCIO  83y  Male    Patient is a 83y old  Male who presents with a chief complaint of Wound Check (20 Apr 2024 07:33)      Subjective:    Objective:  T(C): 36.8 (04-21-24 @ 05:40), Max: 36.8 (04-20-24 @ 19:30)  HR: 73 (04-21-24 @ 05:40) (64 - 73)  BP: 119/67 (04-21-24 @ 05:40) (119/67 - 148/94)  RR: 18 (04-21-24 @ 05:40) (18 - 18)  SpO2: 100% (04-21-24 @ 05:40) (99% - 100%)  I&O's Summary    20 Apr 2024 07:01  -  21 Apr 2024 07:00  --------------------------------------------------------  IN: 300 mL / OUT: 0 mL / NET: 300 mL        PHYSICAL EXAM:  GENERAL: NAD  HEAD:  Atraumatic, Normocephalic  EYES: EOMI, PERRLA, conjunctiva and sclera clear  ENMT: Moist mucous membranes  NECK: Supple, No JVD, trachea midline   NERVOUS SYSTEM:  Alert & Oriented X3, Good concentration; Motor Strength 5/5 B/L upper and lower extremities; DTRs 2+ intact and symmetric  CHEST/LUNG: Clear to auscultation bilaterally; No rales, rhonchi, wheezing, or rubs  HEART: Regular rate and rhythm; No murmurs, rubs, or gallops  ABDOMEN: Soft, Nontender, Nondistended; Bowel sounds present  EXTREMITIES:  2+ Peripheral Pulses, No clubbing, cyanosis, or edema  LYMPH: No lymphadenopathy noted  SKIN: No rashes or lesions    MEDICATIONS  (STANDING):  chlorhexidine 4% Liquid 1 Application(s) Topical daily  Dakins Solution - 1/2 Strength 1 Application(s) Topical daily  ferrous    sulfate 325 milliGRAM(s) Oral daily  gabapentin 300 milliGRAM(s) Oral three times a day  heparin   Injectable 5000 Unit(s) SubCutaneous every 12 hours  metoprolol tartrate 12.5 milliGRAM(s) Oral two times a day  mupirocin 2% Nasal 1 Application(s) Both Nostrils two times a day  pantoprazole    Tablet 40 milliGRAM(s) Oral before breakfast    MEDICATIONS  (PRN):  acetaminophen     Tablet .. 650 milliGRAM(s) Oral every 6 hours PRN Temp greater or equal to 38C (100.4F), Mild Pain (1 - 3)  aluminum hydroxide/magnesium hydroxide/simethicone Suspension 30 milliLiter(s) Oral every 4 hours PRN Dyspepsia  melatonin 3 milliGRAM(s) Oral at bedtime PRN Insomnia  ondansetron Injectable 4 milliGRAM(s) IV Push every 8 hours PRN Nausea and/or Vomiting  oxyCODONE    IR 2.5 milliGRAM(s) Oral every 6 hours PRN Moderate Pain (4 - 6)  oxyCODONE    IR 5 milliGRAM(s) Oral every 6 hours PRN Severe Pain (7 - 10)      LABS:        CAPILLARY BLOOD GLUCOSE          RADIOLOGY & ADDITIONAL TESTS:

## 2024-04-21 NOTE — PROGRESS NOTE ADULT - ATTENDING COMMENTS
84yo M pmh HTN, chronic LE lymphedema, wheelchair bound with b/l leg contractures after falling from ladder years ago, presents 4/16 for concern of chronic osteomyelitis, pending MRI if feasible with his contractures, not septic, monitoring off abx, pending vascular/podiatry plan for management.    1: possible chronic osteomyelitis:  Afebrile, AVSS, normal wbc.  CRP elevated at 36.  Xray Foot AP + Lateral, Right: There is no fracture or dislocation. There is Charcot configuration of the foot/ankle. Age-indeterminate erosion of the fifth metatarsal base, for which correlation for acute osteomyelitis recommended. No visualized soft tissue gas.  Xray Foot AP + Lateral + Oblique, Left: Plantar calcaneal and possibly plantar 5th metatarsal base osteomyelitis.   -per patient he recently was on abx stopped ~8 days ago, and had recent procedure to his feet at OSH.  No records available in [facility].  -appreciate podiatry consult:  -severe bilateral lower extremity lymphedema with scaly skin dorsally and plantarly. right heel wound with exposed calcaneus measuring 8x 8 cm , with scant serosanguinous drainage, no purulence no erythema, left heel wound fibronecrotic wound bed probing to bed measuring 8x 5cm, scant serosanguinous drainage no purulence.  left wound 5th styloid wound measuring 2x2 cm with graft and staples intact.   - Right foot wound culture pending: gram + cocci in pairs   - Bilateral foot foot xrays: Age-indeterminate erosion of the fifth metatarsal base, no gas  -  SUKH/PVR:  The right SUKH of 1.39 and the left SUKH of 1.29 are normal. The reduced right toe-brachial index of 0.50,and the reduced amplitude   of the photoplethysmography at the right great toe is suggestive of disease affecting the small arteries of the right foot.  - Pod plan local wound care vs bilateral wounds debridement pending imaging /vasc recs  -f/u vascular cards - -f/u venous duplex, Arterial Duplex of B/L LE extremities   -as he is hemodynamically stable and not septic will hold abx for now to allow for better cultures with any procedure planned  -lipids: TC 98/ HDL 34/ LDL 53/ TG 46  -a1c 5.8  -f/u MRI  -f/u podiatry plan    2.Anemia: MCV 93.8  unclear baseline  possible mixed picture with AOCD and iron deficiency based on labs.  Giving iv iron for 5 days    3. HTN: C/w metoprolol  - Hold lasix       4.PPX: sqh  PT consult  HCP: friend Leoncio Merchant; provided number not working.  Palliative care consult to assist with GOC and HCP as patient is estranged from family and we have been unable to reach his stated proxy.

## 2024-04-22 ENCOUNTER — TRANSCRIPTION ENCOUNTER (OUTPATIENT)
Age: 84
End: 2024-04-22

## 2024-04-22 LAB
ANION GAP SERPL CALC-SCNC: 9 MMOL/L — SIGNIFICANT CHANGE UP (ref 5–17)
BASOPHILS # BLD AUTO: 0.01 K/UL — SIGNIFICANT CHANGE UP (ref 0–0.2)
BASOPHILS NFR BLD AUTO: 0.2 % — SIGNIFICANT CHANGE UP (ref 0–2)
BUN SERPL-MCNC: 5 MG/DL — LOW (ref 7–23)
CALCIUM SERPL-MCNC: 9.1 MG/DL — SIGNIFICANT CHANGE UP (ref 8.4–10.5)
CHLORIDE SERPL-SCNC: 102 MMOL/L — SIGNIFICANT CHANGE UP (ref 96–108)
CO2 SERPL-SCNC: 27 MMOL/L — SIGNIFICANT CHANGE UP (ref 22–31)
CREAT SERPL-MCNC: 0.34 MG/DL — LOW (ref 0.5–1.3)
CULTURE RESULTS: ABNORMAL
EGFR: 115 ML/MIN/1.73M2 — SIGNIFICANT CHANGE UP
EOSINOPHIL # BLD AUTO: 0.33 K/UL — SIGNIFICANT CHANGE UP (ref 0–0.5)
EOSINOPHIL NFR BLD AUTO: 5.8 % — SIGNIFICANT CHANGE UP (ref 0–6)
GLUCOSE SERPL-MCNC: 132 MG/DL — HIGH (ref 70–99)
HCT VFR BLD CALC: 26.8 % — LOW (ref 39–50)
HGB BLD-MCNC: 8.9 G/DL — LOW (ref 13–17)
IMM GRANULOCYTES NFR BLD AUTO: 0.5 % — SIGNIFICANT CHANGE UP (ref 0–0.9)
LYMPHOCYTES # BLD AUTO: 1.1 K/UL — SIGNIFICANT CHANGE UP (ref 1–3.3)
LYMPHOCYTES # BLD AUTO: 19.3 % — SIGNIFICANT CHANGE UP (ref 13–44)
MCHC RBC-ENTMCNC: 31.6 PG — SIGNIFICANT CHANGE UP (ref 27–34)
MCHC RBC-ENTMCNC: 33.2 GM/DL — SIGNIFICANT CHANGE UP (ref 32–36)
MCV RBC AUTO: 95 FL — SIGNIFICANT CHANGE UP (ref 80–100)
MONOCYTES # BLD AUTO: 0.39 K/UL — SIGNIFICANT CHANGE UP (ref 0–0.9)
MONOCYTES NFR BLD AUTO: 6.8 % — SIGNIFICANT CHANGE UP (ref 2–14)
NEUTROPHILS # BLD AUTO: 3.84 K/UL — SIGNIFICANT CHANGE UP (ref 1.8–7.4)
NEUTROPHILS NFR BLD AUTO: 67.4 % — SIGNIFICANT CHANGE UP (ref 43–77)
NRBC # BLD: 0 /100 WBCS — SIGNIFICANT CHANGE UP (ref 0–0)
ORGANISM # SPEC MICROSCOPIC CNT: ABNORMAL
PLATELET # BLD AUTO: 277 K/UL — SIGNIFICANT CHANGE UP (ref 150–400)
POTASSIUM SERPL-MCNC: 3.7 MMOL/L — SIGNIFICANT CHANGE UP (ref 3.5–5.3)
POTASSIUM SERPL-SCNC: 3.7 MMOL/L — SIGNIFICANT CHANGE UP (ref 3.5–5.3)
RBC # BLD: 2.82 M/UL — LOW (ref 4.2–5.8)
RBC # FLD: 19.3 % — HIGH (ref 10.3–14.5)
SODIUM SERPL-SCNC: 138 MMOL/L — SIGNIFICANT CHANGE UP (ref 135–145)
SPECIMEN SOURCE: SIGNIFICANT CHANGE UP
WBC # BLD: 5.7 K/UL — SIGNIFICANT CHANGE UP (ref 3.8–10.5)
WBC # FLD AUTO: 5.7 K/UL — SIGNIFICANT CHANGE UP (ref 3.8–10.5)

## 2024-04-22 PROCEDURE — 99223 1ST HOSP IP/OBS HIGH 75: CPT | Mod: GC

## 2024-04-22 PROCEDURE — 99233 SBSQ HOSP IP/OBS HIGH 50: CPT | Mod: GC

## 2024-04-22 RX ADMIN — OXYCODONE HYDROCHLORIDE 5 MILLIGRAM(S): 5 TABLET ORAL at 16:37

## 2024-04-22 RX ADMIN — OXYCODONE HYDROCHLORIDE 5 MILLIGRAM(S): 5 TABLET ORAL at 06:20

## 2024-04-22 RX ADMIN — HEPARIN SODIUM 5000 UNIT(S): 5000 INJECTION INTRAVENOUS; SUBCUTANEOUS at 06:41

## 2024-04-22 RX ADMIN — Medication 325 MILLIGRAM(S): at 09:52

## 2024-04-22 RX ADMIN — MUPIROCIN 1 APPLICATION(S): 20 OINTMENT TOPICAL at 06:42

## 2024-04-22 RX ADMIN — GABAPENTIN 300 MILLIGRAM(S): 400 CAPSULE ORAL at 06:20

## 2024-04-22 RX ADMIN — Medication 12.5 MILLIGRAM(S): at 18:54

## 2024-04-22 RX ADMIN — Medication 1 APPLICATION(S): at 07:46

## 2024-04-22 RX ADMIN — OXYCODONE HYDROCHLORIDE 5 MILLIGRAM(S): 5 TABLET ORAL at 22:52

## 2024-04-22 RX ADMIN — GABAPENTIN 300 MILLIGRAM(S): 400 CAPSULE ORAL at 22:52

## 2024-04-22 RX ADMIN — MUPIROCIN 1 APPLICATION(S): 20 OINTMENT TOPICAL at 18:49

## 2024-04-22 RX ADMIN — HEPARIN SODIUM 5000 UNIT(S): 5000 INJECTION INTRAVENOUS; SUBCUTANEOUS at 18:49

## 2024-04-22 RX ADMIN — OXYCODONE HYDROCHLORIDE 5 MILLIGRAM(S): 5 TABLET ORAL at 07:20

## 2024-04-22 RX ADMIN — GABAPENTIN 300 MILLIGRAM(S): 400 CAPSULE ORAL at 14:03

## 2024-04-22 RX ADMIN — CHLORHEXIDINE GLUCONATE 1 APPLICATION(S): 213 SOLUTION TOPICAL at 06:41

## 2024-04-22 RX ADMIN — OXYCODONE HYDROCHLORIDE 5 MILLIGRAM(S): 5 TABLET ORAL at 17:07

## 2024-04-22 RX ADMIN — Medication 12.5 MILLIGRAM(S): at 06:46

## 2024-04-22 RX ADMIN — OXYCODONE HYDROCHLORIDE 5 MILLIGRAM(S): 5 TABLET ORAL at 23:52

## 2024-04-22 RX ADMIN — PANTOPRAZOLE SODIUM 40 MILLIGRAM(S): 20 TABLET, DELAYED RELEASE ORAL at 06:42

## 2024-04-22 NOTE — DISCHARGE NOTE PROVIDER - HOSPITAL COURSE
HPI:  83y M pmh htn, chronic LE lymphedema presents for eval of foot wound (b/l heel wounds to bone).  He states for one week he has had worsening pain with bleeding throughout the sole of the right foot. Was seen by wound care Podiatrist who sent pt to ED for further eval/ tx of leg wound. recently treated with IV antibiotics for 6 weeks  Pt is wheelchair bound, lives at home, has full time aides to help him.     ROS: Denies CP, SOB, palpitation, N/V/D, fever, cough, chills, dizziness, abm pain, recent travel, sick contact, change in bowel and urinary habits     Hospital Course:  admitted for possible podiatry intervention for 5th metatarsal OM. Rec'd R foot MRI which confirmed osteo but refused L foot MRI and declined any surgical management for his   feet. He had been seen by ID who deemed further abx non-beneficial as he had recently finished a course of 6 weeks of IV abx and his superficial wound cultures are of limited significance. Recommend wound care and follow ups per podiatry      Important Medication Changes and Reason:    Active or Pending Issues Requiring Follow-up:    Advanced Directives:   [X] Full code  [ ] DNR  [ ] Hospice    Discharge Diagnoses:  Osteomyelitis b/l feet                   HPI:  83y M pmh htn, chronic LE lymphedema presents for eval of foot wound (b/l heel wounds to bone).  He states for one week he has had worsening pain with bleeding throughout the sole of the right foot. Was seen by wound care Podiatrist who sent pt to ED for further eval/ tx of leg wound. recently treated with IV antibiotics for 6 weeks  Pt is wheelchair bound, lives at home, has full time aides to help him.     ROS: Denies CP, SOB, palpitation, N/V/D, fever, cough, chills, dizziness, abm pain, recent travel, sick contact, change in bowel and urinary habits     Hospital Course:  admitted for possible podiatry intervention for 5th metatarsal OM. Rec'd R foot MRI which confirmed osteo but refused L foot MRI and declined any surgical management for his   feet. He had been seen by ID who deemed further abx non-beneficial as he had recently finished a course of 6 weeks of IV abx and his superficial wound cultures are of limited significance. Recommend wound care and regular follow ups per podiatry    Important Medication Changes and Reason: N/A    Active or Pending Issues Requiring Follow-up:   Follow up with Podiatry in 1 week    Advanced Directives:   [X] Full code  [ ] DNR  [ ] Hospice    Discharge Diagnoses:  Osteomyelitis b/l feet                   HPI:  83y M pmh htn, chronic LE lymphedema presents for eval of foot wound (b/l heel wounds to bone).  He states for one week he has had worsening pain with bleeding throughout the sole of the right foot. Was seen by wound care Podiatrist who sent pt to ED for further eval/ tx of leg wound. recently treated with IV antibiotics for 6 weeks  Pt is wheelchair bound, lives at home, has full time aides to help him.     ROS: Denies CP, SOB, palpitation, N/V/D, fever, cough, chills, dizziness, abm pain, recent travel, sick contact, change in bowel and urinary habits     Hospital Course:  admitted for possible podiatry intervention for 5th metatarsal OM. Rec'd R foot MRI which confirmed osteo but refused L foot MRI and declined any surgical management for his   feet. He had been seen by ID who deemed further abx non-beneficial as he had recently finished a course of 6 weeks of IV abx and his superficial wound cultures are of limited significance. Recommend wound care and regular follow ups per podiatry    Important Medication Changes and Reason: 5 days oxycodone for pain     Active or Pending Issues Requiring Follow-up:   Follow up with Podiatry in 1 week    Advanced Directives:   [X] Full code  [ ] DNR  [ ] Hospice    Discharge Diagnoses:  Osteomyelitis b/l feet

## 2024-04-22 NOTE — PROVIDER CONTACT NOTE (CRITICAL VALUE NOTIFICATION) - TEST AND RESULT REPORTED:
positive abscess collection from 4/16
Patient has 4 types of aerobic and/or anaerobic bacteria from abscess right foot wound culture from 4/16/2024
Abscess Culture of right foot wound from 4/16

## 2024-04-22 NOTE — DISCHARGE NOTE PROVIDER - NSDCCPCAREPLAN_GEN_ALL_CORE_FT
PRINCIPAL DISCHARGE DIAGNOSIS  Diagnosis: Osteomyelitis  Assessment and Plan of Treatment:      PRINCIPAL DISCHARGE DIAGNOSIS  Diagnosis: Osteomyelitis  Assessment and Plan of Treatment: You came in for worsening foot pain and bleeding which stems from a chronic bone infection in both feet. You were seen by both our Podiatry and Vascular Surgery teams and recommended possible surgical options to help lower the risk of the infection becoming worse, however you declined further management and made aware of the risks of your decision in that the infection could potentially further evolve and enter your blood stream making you incredibly ill. Our Infectious Disease doctors had also seen you and advised against further antibiotics seeing as how you just finished a long course of them and they believe receiving more antibiotics would have limited benefit and are not warranted at this time. For now please follow up with the Wound Care/Podiatry team for regular outpatient visits.  Contact a health care provider if:  You develop a fever or chills.  You have redness, warmth, pain, or swelling that returns after treatment.  Get help right away if:  You have rapid breathing or you have trouble breathing.  You have chest pain.  You cannot drink fluids or make urine.  The affected area swells, changes color, or turns blue.  You have numbness or severe pain in the affected area.

## 2024-04-22 NOTE — CONSULT NOTE ADULT - CONSULT REQUESTED DATE/TIME
18-Apr-2024 13:02
[Time Spent: ___ minutes] : I have spent [unfilled] minutes of time on the encounter.
16-Apr-2024 19:44
22-Apr-2024 14:46

## 2024-04-22 NOTE — DISCHARGE NOTE PROVIDER - DETAILS OF MALNUTRITION DIAGNOSIS/DIAGNOSES
This patient has been assessed with a concern for Malnutrition and was treated during this hospitalization for the following Nutrition diagnosis/diagnoses:     -  04/18/2024: Severe protein-calorie malnutrition

## 2024-04-22 NOTE — CONSULT NOTE ADULT - ATTENDING COMMENTS
lymphedema on exam  SUKH is pulsatile upon review    Testing:    - echo  - arterial duplex  - BNP    would get wound care to see him to wrap legs/lymphedema care  Doubt arterial insufficiency
Patient seen and examined. Chart with pertinent labs and imaging reviewed.   Patient just treated for osteomyelitis- as such radiographic findings could well represent prior and not active disease.  He adamantly refuses any surgical intervention, even bone biopsy.  Surface cx of no value here  I do not think that another prolonged course of IV antibiotics will make a positive impact- if he has residual disease needs debridement for cure  Would manage expectantly.  Branden Lewis MD  Attending Physician  [facility]  Division of Infectious Diseases  [phone]

## 2024-04-22 NOTE — DISCHARGE NOTE PROVIDER - NSDCFUADDAPPT_GEN_ALL_CORE_FT
Podiatry Discharge Instructions:  Follow up: Please follow up with Dr. Catalan at the wound care center at [facility] within 1 week of discharge from the hospital, please call [phone] for appointment and discuss that you recently were seen in the hospital.  Wound Care:Please apply santyl, followed by aqaucel to the both heels, followed by 4x4 gauze, and ABD pad, and naomi. Please off load the heel at ALL times with off-loading shoes while not ambulating.   Weight bearing: Please off load the heel at ALL times with off-loading shoes while not ambulating.   Antibiotics: Please continue as instructed. Podiatry Discharge Instructions:  Follow up: Please follow up with Dr. Catalan at the wound care center at [facility] within 1 week of discharge from the hospital, please call [phone] for appointment and discuss that you recently were seen in the hospital.  Wound Care: Please apply santyl, followed by aqaucel to the both heels, followed by 4x4 gauze, and ABD pad, and naomi. Please off load the heel at ALL times with off-loading shoes while not ambulating.   Weight bearing: Please off load the heel at ALL times with off-loading shoes while not ambulating.   Antibiotics: Please continue as instructed. Podiatry Discharge Instructions:  Follow up: Please follow up with Dr. Catalan at the wound care center at [facility] within 1 week of discharge from the hospital, please call [phone] for appointment and discuss that you recently were seen in the hospital.  Wound Care: Please apply santyl, followed by aqaucel to the both heels, followed by 4x4 gauze, and ABD pad, and naomi. Please off load the heel at ALL times with off-loading shoes while not ambulating. Patient requires changes in dressings 3 times a week.   Weight bearing: Please off load the heel at ALL times with off-loading shoes while not ambulating.   Antibiotics: Please continue as instructed.

## 2024-04-22 NOTE — PROVIDER CONTACT NOTE (CRITICAL VALUE NOTIFICATION) - SITUATION
Patient has 4 types of aerobic and/or anaerobic bacteria from abscess right foot wound culture from 4/16/2024
Rare polymorphonuclear leukocytes seen per low power field numerous gram + cocci in pairs seen per oil power fields numerous gram variable seen per oil power fields.  ESR canceled QNS
4 types of aerobic and anaerobic bacteria. vanco resistance

## 2024-04-22 NOTE — PROGRESS NOTE ADULT - SUBJECTIVE AND OBJECTIVE BOX
Patient is a 83y old  Male who presents with a chief complaint of Wound Check (21 Apr 2024 08:51)       INTERVAL HPI/OVERNIGHT EVENTS:  Patient seen and evaluated at bedside.  Pt is resting comfortable in NAD. Denies N/V/F/C.    Allergies    No Known Allergies    Intolerances        Vital Signs Last 24 Hrs  T(C): 36.8 (22 Apr 2024 06:47), Max: 36.8 (22 Apr 2024 06:47)  T(F): 98.3 (22 Apr 2024 06:47), Max: 98.3 (22 Apr 2024 06:47)  HR: 94 (22 Apr 2024 06:47) (70 - 94)  BP: 130/80 (22 Apr 2024 06:47) (102/54 - 134/75)  BP(mean): --  RR: 18 (22 Apr 2024 06:47) (18 - 18)  SpO2: 98% (22 Apr 2024 06:47) (97% - 99%)    Parameters below as of 22 Apr 2024 06:47  Patient On (Oxygen Delivery Method): room air        LABS:              CAPILLARY BLOOD GLUCOSE          Lower Extremity Physical Exam:  Neuro: Epicritic sensation intact to the level of toes, B/L.  Musculoskeletal/Ortho: contracted  Skin: Severe bilateral lower extremity lymphedema with scaly skin dorsally and plantarly. right heel wound w exposed calcaneus, fibroganular woudn bed  measuring 8x 8 cm , scant serosanguinous drainage, no purulence, no erythema, left heel wound fibronecrotic wound bed probing to bone measuring 8x 5cm, scant serosanguinous drainage no purulence.  left wound 5th styloid wound measuring 2x2 cm with graft and staples kept intact, non-boggy, no acute signs of infection B/L.       RADIOLOGY & ADDITIONAL TESTS:

## 2024-04-22 NOTE — DISCHARGE NOTE PROVIDER - NSDCCPTREATMENT_GEN_ALL_CORE_FT
PRINCIPAL PROCEDURE  Procedure: MRI  Findings and Treatment: IMPRESSION:  Osteomyelitis involving the calcaneus and the proximal fifth metatarsal,   adjacent to soft tissue wounds, as described.< from: MR Foot w/wo IV Cont, Right (04.20.24 @ 18:26) >        SECONDARY PROCEDURE  Procedure: XR foot complete  Findings and Treatment: IMPRESSION:  Plantar calcaneal and possibly plantar 5th metatarsal base osteomyelitis.   MRI can be obtained to further assess as warranted.< from: Xray Foot AP + Lateral + Oblique, Left (04.16.24 @ 22:27) >

## 2024-04-22 NOTE — PROGRESS NOTE ADULT - ASSESSMENT
83M presents w BL LE lymphedema  - Patient seen and evaluated  - Afebrile, No leukocytosis   - Severe bilateral lower extremity lymphedema with scaly skin dorsally and plantarly. right heel wound w exposed calcaneus, fibroganular woudn bed  measuring 8x 8 cm , scant serosanguinous drainage, no purulence, no erythema, left heel wound fibronecrotic wound bed probing to bone measuring 8x 5cm, scant serosanguinous drainage no purulence.  left wound 5th styloid wound measuring 2x2 cm with graft and staples kept intact, non-boggy, no acute signs of infection B/L.   - Right foot wound culture: Proteus Mirabilis, MRSA< E. Faecalis   - Bilateral foot foot xrays: Age-indeterminate erosion of the fifth metatarsal base, no gas  - Right foot MRI: calc OM, 5th met OM, no gas  - Left foot MRI: pending   - SUKH/PVR: RABI 1.39, LABI 1.29  - Vascular recs appreciated  - Recommend Behavior health   - Recommend PT consult for passive/active strengthening of lower legs in order to help patient extend his lower extremities   - Recommend ID consult - patient is s/p B/L wound debridements w/ graft applications on 2/21 at [facility] where he was d/c on 6weeks of IV abx 2/2 high concern for infection. His previous intra-op culture grew pseudomonas in which it was adjunctly treated w/ PO Cipro.   - Extensive discussion w/ patient regarding prognosis of his B/L lower extremities. Given the nature of his contracted limbs, and inability to ambulate, patient is at high risk for limb loss. Discussed the option of possible bone biopsy of the calc and possible further debridements, however patient is strongly against any procedures or intervention at this time and states he is able to treat it himself.   - Pod plan local wound care. No podiatric interventions during this admission  - Seen with attending

## 2024-04-22 NOTE — CHART NOTE - NSCHARTNOTEFT_GEN_A_CORE
Palliative team consulted for GOC.  Chart reviewed and case discussed with primary team.  Per Dr. Rubio team able to have GOC discussion with patient, who wishes to continue with conservative medical management and prolonged course of abx for infection. No palliative needs identified at this time as goals are established. Reconsult as needed.    Arlene Sweet MD  Geriatrics and Palliative Medicine Attending  [facility] pager: [phone]

## 2024-04-22 NOTE — PROGRESS NOTE ADULT - ATTENDING COMMENTS
84yo M pmh HTN, chronic LE lymphedema, wheelchair bound with b/l leg contractures after falling from ladder years ago, presents 4/16 for concern of chronic osteomyelitis.    -MRI RLE: Osteomyelitis involving the calcaneus and the proximal fifth metatarsal, adjacent to soft tissue wounds  Pending MRI LLE.   RLE wound Cx: proteus, MRSA, VRE  Podiatry following - s/p B/L wound debridements w/ graft applications on 2/21 at [facility] where he was d/c on 6weeks of IV abx 2/2 high concern for infection. His previous intra-op culture grew pseudomonas in which it was adjunctly treated w/ PO Cipro.   Given the nature of his contracted limbs, and inability to ambulate, patient is at high risk for limb loss.   Podiatry team discussed option of possible bone biopsy of the calc and possible further debridements, however patient is strongly against any procedures or intervention at this time.  F/u ID recs     -SUKH/PVR: The right SUKH of 1.39 and the left SUKH of 1.29 are normal. 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  f/u vascular cards - attempted Arterial Duplex of B/L LE extremities but unable to tolerated. F/u vasc recs.

## 2024-04-22 NOTE — CONSULT NOTE ADULT - ASSESSMENT
83y M pmh htn, chronic LE lymphedema presents for eval of foot wound.  He states for one week he has had worsening pain with bleeding throughout the sole of the right foot. Was seen by wound care Podiatrist who sent pt to ED for further eval/ tx of leg wound. Pt is wheelchair bound, lives at home, has full time aides to help him. ROS: Denies CP, SOB, palpitation, N/V/D, fever, cough, chills, dizziness, abm pain, recent travel, sick contact, change in bowel and urinary habits.    Hospital course; has remained afebrile, no leucocytosis.     MRI Rt foot  Osteomyelitis involving the calcaneus and the proximal fifth metatarsal,   adjacent to soft tissue wounds, as described.    < from: Xray Foot AP + Lateral + Oblique, Left (04.16.24 @ 22:27) >  Plantar calcaneal and possibly plantar 5th metatarsal base osteomyelitis.   MRI can be obtained to further assess as warranted.    Would Cx rt foot    Would Cx rt foot: MRSA, Proteus spp,  VRE    # B/l foot osteomyelitis  # b/l Chronic Lymphedema    Off antibiotics      Patient to be seen   83y M pmh htn, chronic LE lymphedema presents for eval of foot wound.  He states for one week he has had worsening pain with bleeding throughout the sole of the right foot. Was seen by wound care Podiatrist who sent pt to ED for further eval/ tx of leg wound. Pt is wheelchair bound, lives at home, has full time aides to help him. ROS: Denies CP, SOB, palpitation, N/V/D, fever, cough, chills, dizziness, abm pain, recent travel, sick contact, change in bowel and urinary habits.    Hospital course; has remained afebrile, no leucocytosis.     MRI Rt foot  Osteomyelitis involving the calcaneus and the proximal fifth metatarsal,   adjacent to soft tissue wounds, as described.    < from: Xray Foot AP + Lateral + Oblique, Left (04.16.24 @ 22:27) >  Plantar calcaneal and possibly plantar 5th metatarsal base osteomyelitis.   MRI can be obtained to further assess as warranted.    Would Cx rt foot    Would Cx rt foot: MRSA, Proteus spp,  VRE    # B/l foot osteomyelitis  # b/l Chronic Lymphedema  # Positive wound cultures    - b/l heel wounds to bone/ recently treated with IV antibiotics for 6 weeks  - his imaging findings likely associated with recently treated infection  - Positive superficial wound cultures are of limited significance  - Do not think antibiotics will offer any benefit in this situation  - Recommend wound care and follow ups per podiatry      Discussed with attending    Derian Colunga MD, PGY-4  ID Fellow  [employer] Preferred  After 5pm/weekends call [phone]

## 2024-04-22 NOTE — DISCHARGE NOTE PROVIDER - NSDCMRMEDTOKEN_GEN_ALL_CORE_FT
Gabapentin:   Lasix:   Metoprolol:   Midodrine:   Pantoprazole:   Potassium Chloride:    gabapentin 300 mg oral capsule: 1 cap(s) orally 3 times a day  Metoprolol Tartrate 25 mg oral tablet: 0.5 tab(s) orally 2 times a day  oxyCODONE 5 mg oral tablet: 1 tab(s) orally every 6 hours as needed for Severe Pain (7 - 10) MDD: 20mg  Senna 8.6 mg oral tablet: 1 tab(s) orally once a day

## 2024-04-22 NOTE — CONSULT NOTE ADULT - SUBJECTIVE AND OBJECTIVE BOX
Patient is a 83y old  Male who presents with a chief complaint of Wound Check (22 Apr 2024 12:45)    HPI:  83y M pmh htn, chronic LE lymphedema presents for eval of foot wound.  He states for one week he has had worsening pain with bleeding throughout the sole of the right foot. Was seen by wound care Podiatrist who sent pt to ED for further eval/ tx of leg wound. Pt is wheelchair bound, lives at home, has full time aides to help him. ROS: Denies CP, SOB, palpitation, N/V/D, fever, cough, chills, dizziness, abm pain, recent travel, sick contact, change in bowel and urinary habits.    Hospital course; has remained afebrile, no leucocytosis.     MRI Rt foot  Osteomyelitis involving the calcaneus and the proximal fifth metatarsal,   adjacent to soft tissue wounds, as described.    < from: Xray Foot AP + Lateral + Oblique, Left (04.16.24 @ 22:27) >  Plantar calcaneal and possibly plantar 5th metatarsal base osteomyelitis.   MRI can be obtained to further assess as warranted.    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   25 mL/Hr IV Intermittent (04-17-24 @ 06:21)   25 mL/Hr IV Intermittent (04-17-24 @ 00:24)    piperacillin/tazobactam IVPB...   200 mL/Hr IV Intermittent (04-16-24 @ 18:02)    vancomycin  IVPB   166.67 mL/Hr IV Intermittent (04-17-24 @ 06:21)    vancomycin  IVPB.   250 mL/Hr IV Intermittent (04-16-24 @ 18:28)          MEDICATIONS  (STANDING):  acetaminophen     Tablet .. 650 every 6 hours PRN  aluminum hydroxide/magnesium hydroxide/simethicone Suspension 30 every 4 hours PRN  gabapentin 300 three times a day  heparin   Injectable 5000 every 12 hours  melatonin 3 at bedtime PRN  metoprolol tartrate 12.5 two times a day  ondansetron Injectable 4 every 8 hours PRN  oxyCODONE    IR 2.5 every 6 hours PRN  oxyCODONE    IR 5 every 6 hours PRN  pantoprazole    Tablet 40 before breakfast    SOCIAL HISTORY:       FAMILY HISTORY:    REVIEW OF SYSTEMS  [  ] ROS unobtainable because:    [  ] All other systems negative except as noted below:	    Constitutional:  [ ] fever [ ] chills  [ ] weight loss  [ ] weakness  Skin:  [ ] rash [ ] phlebitis	  Eyes: [ ] icterus [ ] pain  [ ] discharge	  ENMT: [ ] sore throat  [ ] thrush [ ] ulcers [ ] exudates  Respiratory: [ ] dyspnea [ ] hemoptysis [ ] cough [ ] sputum	  Cardiovascular:  [ ] chest pain [ ] palpitations [ ] edema	  Gastrointestinal:  [ ] nausea [ ] vomiting [ ] diarrhea [ ] constipation [ ] pain	  Genitourinary:  [ ] dysuria [ ] frequency [ ] hematuria [ ] discharge [ ] flank pain  [ ] incontinence  Musculoskeletal:  [ ] myalgias [ ] arthralgias [ ] arthritis  [ ] back pain  Neurological:  [ ] headache [ ] seizures  [ ] confusion/altered mental status  Psychiatric:  [ ] anxiety [ ] depression	  Hematology/Lymphatics:  [ ] lymphadenopathy  Endocrine:  [ ] adrenal [ ] thyroid  Allergic/Immunologic:	 [ ] transplant [ ] seasonal    Vital Signs Last 24 Hrs  T(F): 98.1 (04-22-24 @ 12:15), Max: 98.7 (04-18-24 @ 04:48)  Vital Signs Last 24 Hrs  HR: 73 (04-22-24 @ 12:15) (73 - 94)  BP: 97/47 (04-22-24 @ 12:15) (97/47 - 134/75)  RR: 18 (04-22-24 @ 12:15)  SpO2: 98% (04-22-24 @ 12:15) (97% - 98%)  Wt(kg): --    PHYSICAL EXAM:                              8.9    5.70  )-----------( 277      ( 22 Apr 2024 10:39 )             26.8   04-22    138  |  102  |  5<L>  ----------------------------<  132<H>  3.7   |  27  |  0.34<L>    Ca    9.1      22 Apr 2024 10:39      Urinalysis Basic - ( 22 Apr 2024 10:39 )    Color: x / Appearance: x / SG: x / pH: x  Gluc: 132 mg/dL / Ketone: x  / Bili: x / Urobili: x   Blood: x / Protein: x / Nitrite: x   Leuk Esterase: x / RBC: x / WBC x   Sq Epi: x / Non Sq Epi: x / Bacteria: x    MICROBIOLOGY:              RADIOLOGY:  imaging below personally reviewed and agree with findings    < from: MR Foot w/wo IV Cont, Right (04.20.24 @ 18:26) >    ACC: [id number] EXAM:  MR FOOT WAW IC RT   ORDERED BY:  LACEY BEAR     PROCEDURE DATE:  04/20/2024          INTERPRETATION:  Exam Type: MR FOOT WITHOUT AND WITH IV CONTRAST RIGHT  Exam Date and Time: 4/20/2024 6:26 PM  Indication: Foot pain. Concern for prostatitis.  Comparison: Foot x-rays 4/16/2024.    TECHNIQUE:  Multiplanar multisequence MRI of the right ankle was performed before and   after administration of 8.5 cc of Gadavist (1.5 cc discarded).    FINDINGS:  There is a soft tissue woundabout the heel. There are surrounding   phlegmon changes about this wound without a drainable fluid collection.   Deep to the wound, there is extensive low T1 signal with associated high   STIR signal involving the calcaneus. There is also associated  postcontrast enhancement of the calcaneus. Findings are consistent with   osteomyelitis. There is background altered morphology of the calcaneus   which may possibly due to postsurgical changes. Otherwise, the changes   seen in the calcaneus are related to osseous destruction secondary to the   osteomyelitis.    There is a soft tissue wound identified along the lateral   midfoot/forefoot adjacent to the base of the fifth metatarsal.   Phlegmonous changes are also present in the subcutaneous tissues about   this wound. There is low T1 signal and high STIR signal with enhancement   of the bone on the postcontrast images. Findings are consistent with   osteomyelitis.    There is no additional low T1 signal identified. Patchy high STIR signal   is seen about the bones of the hindfoot and midfoot, felt to related to   demineralization changes. There is no fracture. No dislocation. There is   a tibiotalar joint effusion.. Trace fluid is seen within the subtalar   joint.    There is no evidence of a tenosynovitis. There is fatty infiltration of   the intrinsic forefoot musculature. Diffuse subcutaneous edema and   swelling about the distal leg and dorsal foot.      IMPRESSION:  Osteomyelitis involving the calcaneus and the proximal fifth metatarsal,   adjacent to soft tissue wounds, as described.    --- End of Report ---      < end of copied text >   Patient is a 83y old  Male who presents with a chief complaint of Wound Check (22 Apr 2024 12:45)    HPI:  83y M pmh htn, chronic LE lymphedema, s/p recent prolonged IV antibiotic course presents for eval of foot wound.  He states for one week he has had worsening pain with bleeding throughout the sole of the right foot. Was seen by wound care Podiatrist who sent pt to ED for further eval/ tx of leg wound. Pt is wheelchair bound, lives at home, has full time aides to help him. ROS: Denies CP, SOB, palpitation, N/V/D, fever, cough, chills, dizziness, abdomen pain, recent travel, sick contact, change in bowel and urinary habits.    patient is s/p B/L wound debridements w/ graft applications on 2/21 at [facility] where he was d/c on 6weeks of IV abx 2/2 high concern for infection. He reports he stayed in the hospital for whole antibiotics course and returned home on 4/8/22. Apparently his previous intra-op culture grew pseudomonas in which it was adjunctly treated w/ PO.    Hospital course; has remained afebrile, no leucocytosis.     MRI Rt foot  Osteomyelitis involving the calcaneus and the proximal fifth metatarsal,   adjacent to soft tissue wounds, as described.    < from: Xray Foot AP + Lateral + Oblique, Left (04.16.24 @ 22:27) >  Plantar calcaneal and possibly plantar 5th metatarsal base osteomyelitis.   MRI can be obtained to further assess as warranted.    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   25 mL/Hr IV Intermittent (04-17-24 @ 06:21)   25 mL/Hr IV Intermittent (04-17-24 @ 00:24)    piperacillin/tazobactam IVPB...   200 mL/Hr IV Intermittent (04-16-24 @ 18:02)    vancomycin  IVPB   166.67 mL/Hr IV Intermittent (04-17-24 @ 06:21)    vancomycin  IVPB.   250 mL/Hr IV Intermittent (04-16-24 @ 18:28)          MEDICATIONS  (STANDING):  acetaminophen     Tablet .. 650 every 6 hours PRN  aluminum hydroxide/magnesium hydroxide/simethicone Suspension 30 every 4 hours PRN  gabapentin 300 three times a day  heparin   Injectable 5000 every 12 hours  melatonin 3 at bedtime PRN  metoprolol tartrate 12.5 two times a day  ondansetron Injectable 4 every 8 hours PRN  oxyCODONE    IR 2.5 every 6 hours PRN  oxyCODONE    IR 5 every 6 hours PRN  pantoprazole    Tablet 40 before breakfast    SOCIAL HISTORY:  Lives at home, no tobacco, ethoh, IVDU reported    FAMILY HISTORY: No pertinent history in first degree relatives    REVIEW OF SYSTEMS  [  ] ROS unobtainable because:    [ x ] All other systems negative except as noted below:	    Constitutional:  [ ] fever [ ] chills  [ ] weight loss  [ ] weakness  Skin:  [ ] rash [ ] phlebitis	  Eyes: [ ] icterus [ ] pain  [ ] discharge	  ENMT: [ ] sore throat  [ ] thrush [ ] ulcers [ ] exudates  Respiratory: [ ] dyspnea [ ] hemoptysis [ ] cough [ ] sputum	  Cardiovascular:  [ ] chest pain [ ] palpitations [ ] edema	  Gastrointestinal:  [ ] nausea [ ] vomiting [ ] diarrhea [ ] constipation [ ] pain	  Genitourinary:  [ ] dysuria [ ] frequency [ ] hematuria [ ] discharge [ ] flank pain  [ ] incontinence  Musculoskeletal:  [ ] myalgias [ ] arthralgias [ ] arthritis  [ ] back pain  Neurological:  [ ] headache [ ] seizures  [ ] confusion/altered mental status  Psychiatric:  [ ] anxiety [ ] depression	  Hematology/Lymphatics:  [ ] lymphadenopathy  Endocrine:  [ ] adrenal [ ] thyroid  Allergic/Immunologic:	 [ ] transplant [ ] seasonal    Vital Signs Last 24 Hrs  T(F): 98.1 (04-22-24 @ 12:15), Max: 98.7 (04-18-24 @ 04:48)  Vital Signs Last 24 Hrs  HR: 73 (04-22-24 @ 12:15) (73 - 94)  BP: 97/47 (04-22-24 @ 12:15) (97/47 - 134/75)  RR: 18 (04-22-24 @ 12:15)  SpO2: 98% (04-22-24 @ 12:15) (97% - 98%)  Wt(kg): --    PHYSICAL EXAM:    General: Patient in NAD  HEENT: NCAT, EOMI, PERRL, no oral lesions  CV: S1+S2, no m/r/g appreciated   Lungs: No respiratory distress, CTAB  Abd: Soft, nontender, no guarding, no rebound tenderness, + bowel sounds   : No suprapubic tenderness  Neuro: Alert and oriented to time, place and person. Moves all extremities against gravity.  Ext: BLE lymphedema, chronic skin changes, b/l heel bandage intact  Skin: no phlebitis                              8.9    5.70  )-----------( 277      ( 22 Apr 2024 10:39 )             26.8   04-22    138  |  102  |  5<L>  ----------------------------<  132<H>  3.7   |  27  |  0.34<L>    Ca    9.1      22 Apr 2024 10:39      Urinalysis Basic - ( 22 Apr 2024 10:39 )    Color: x / Appearance: x / SG: x / pH: x  Gluc: 132 mg/dL / Ketone: x  / Bili: x / Urobili: x   Blood: x / Protein: x / Nitrite: x   Leuk Esterase: x / RBC: x / WBC x   Sq Epi: x / Non Sq Epi: x / Bacteria: x    MICROBIOLOGY:              RADIOLOGY:  imaging below personally reviewed and agree with findings    < from: MR Foot w/wo IV Cont, Right (04.20.24 @ 18:26) >    ACC: [id number] EXAM:  MR FOOT WAW IC RT   ORDERED BY:  LACEY BEAR     PROCEDURE DATE:  04/20/2024          INTERPRETATION:  Exam Type: MR FOOT WITHOUT AND WITH IV CONTRAST RIGHT  Exam Date and Time: 4/20/2024 6:26 PM  Indication: Foot pain. Concern for prostatitis.  Comparison: Foot x-rays 4/16/2024.    TECHNIQUE:  Multiplanar multisequence MRI of the right ankle was performed before and   after administration of 8.5 cc of Gadavist (1.5 cc discarded).    FINDINGS:  There is a soft tissue woundabout the heel. There are surrounding   phlegmon changes about this wound without a drainable fluid collection.   Deep to the wound, there is extensive low T1 signal with associated high   STIR signal involving the calcaneus. There is also associated  postcontrast enhancement of the calcaneus. Findings are consistent with   osteomyelitis. There is background altered morphology of the calcaneus   which may possibly due to postsurgical changes. Otherwise, the changes   seen in the calcaneus are related to osseous destruction secondary to the   osteomyelitis.    There is a soft tissue wound identified along the lateral   midfoot/forefoot adjacent to the base of the fifth metatarsal.   Phlegmonous changes are also present in the subcutaneous tissues about   this wound. There is low T1 signal and high STIR signal with enhancement   of the bone on the postcontrast images. Findings are consistent with   osteomyelitis.    There is no additional low T1 signal identified. Patchy high STIR signal   is seen about the bones of the hindfoot and midfoot, felt to related to   demineralization changes. There is no fracture. No dislocation. There is   a tibiotalar joint effusion.. Trace fluid is seen within the subtalar   joint.    There is no evidence of a tenosynovitis. There is fatty infiltration of   the intrinsic forefoot musculature. Diffuse subcutaneous edema and   swelling about the distal leg and dorsal foot.      IMPRESSION:  Osteomyelitis involving the calcaneus and the proximal fifth metatarsal,   adjacent to soft tissue wounds, as described.    --- End of Report ---      < end of copied text >

## 2024-04-22 NOTE — PROVIDER CONTACT NOTE (CRITICAL VALUE NOTIFICATION) - ACTION/TREATMENT ORDERED:
MD aware. Will continue to monitor.
MD made aware. MD states "ID is following and recommended wound care" . No further concerns at the moment

## 2024-04-23 DIAGNOSIS — M86.9 OSTEOMYELITIS, UNSPECIFIED: ICD-10-CM

## 2024-04-23 LAB
BASOPHILS # BLD AUTO: 0.01 K/UL — SIGNIFICANT CHANGE UP (ref 0–0.2)
BASOPHILS NFR BLD AUTO: 0.2 % — SIGNIFICANT CHANGE UP (ref 0–2)
EOSINOPHIL # BLD AUTO: 0.36 K/UL — SIGNIFICANT CHANGE UP (ref 0–0.5)
EOSINOPHIL NFR BLD AUTO: 7 % — HIGH (ref 0–6)
HCT VFR BLD CALC: 27.6 % — LOW (ref 39–50)
HGB BLD-MCNC: 9 G/DL — LOW (ref 13–17)
IMM GRANULOCYTES NFR BLD AUTO: 0.6 % — SIGNIFICANT CHANGE UP (ref 0–0.9)
LYMPHOCYTES # BLD AUTO: 0.69 K/UL — LOW (ref 1–3.3)
LYMPHOCYTES # BLD AUTO: 13.4 % — SIGNIFICANT CHANGE UP (ref 13–44)
MCHC RBC-ENTMCNC: 30.8 PG — SIGNIFICANT CHANGE UP (ref 27–34)
MCHC RBC-ENTMCNC: 32.6 GM/DL — SIGNIFICANT CHANGE UP (ref 32–36)
MCV RBC AUTO: 94.5 FL — SIGNIFICANT CHANGE UP (ref 80–100)
MONOCYTES # BLD AUTO: 0.34 K/UL — SIGNIFICANT CHANGE UP (ref 0–0.9)
MONOCYTES NFR BLD AUTO: 6.6 % — SIGNIFICANT CHANGE UP (ref 2–14)
NEUTROPHILS # BLD AUTO: 3.71 K/UL — SIGNIFICANT CHANGE UP (ref 1.8–7.4)
NEUTROPHILS NFR BLD AUTO: 72.2 % — SIGNIFICANT CHANGE UP (ref 43–77)
NRBC # BLD: 0 /100 WBCS — SIGNIFICANT CHANGE UP (ref 0–0)
PLATELET # BLD AUTO: 293 K/UL — SIGNIFICANT CHANGE UP (ref 150–400)
RBC # BLD: 2.92 M/UL — LOW (ref 4.2–5.8)
RBC # FLD: 19.4 % — HIGH (ref 10.3–14.5)
WBC # BLD: 5.14 K/UL — SIGNIFICANT CHANGE UP (ref 3.8–10.5)
WBC # FLD AUTO: 5.14 K/UL — SIGNIFICANT CHANGE UP (ref 3.8–10.5)

## 2024-04-23 PROCEDURE — 99232 SBSQ HOSP IP/OBS MODERATE 35: CPT | Mod: GC

## 2024-04-23 RX ADMIN — OXYCODONE HYDROCHLORIDE 5 MILLIGRAM(S): 5 TABLET ORAL at 14:33

## 2024-04-23 RX ADMIN — MUPIROCIN 1 APPLICATION(S): 20 OINTMENT TOPICAL at 05:56

## 2024-04-23 RX ADMIN — Medication 12.5 MILLIGRAM(S): at 17:39

## 2024-04-23 RX ADMIN — Medication 12.5 MILLIGRAM(S): at 05:56

## 2024-04-23 RX ADMIN — HEPARIN SODIUM 5000 UNIT(S): 5000 INJECTION INTRAVENOUS; SUBCUTANEOUS at 17:39

## 2024-04-23 RX ADMIN — GABAPENTIN 300 MILLIGRAM(S): 400 CAPSULE ORAL at 05:55

## 2024-04-23 RX ADMIN — Medication 325 MILLIGRAM(S): at 11:27

## 2024-04-23 RX ADMIN — OXYCODONE HYDROCHLORIDE 5 MILLIGRAM(S): 5 TABLET ORAL at 06:55

## 2024-04-23 RX ADMIN — Medication 1 APPLICATION(S): at 11:27

## 2024-04-23 RX ADMIN — GABAPENTIN 300 MILLIGRAM(S): 400 CAPSULE ORAL at 14:32

## 2024-04-23 RX ADMIN — CHLORHEXIDINE GLUCONATE 1 APPLICATION(S): 213 SOLUTION TOPICAL at 05:54

## 2024-04-23 RX ADMIN — HEPARIN SODIUM 5000 UNIT(S): 5000 INJECTION INTRAVENOUS; SUBCUTANEOUS at 05:56

## 2024-04-23 RX ADMIN — OXYCODONE HYDROCHLORIDE 5 MILLIGRAM(S): 5 TABLET ORAL at 05:55

## 2024-04-23 RX ADMIN — PANTOPRAZOLE SODIUM 40 MILLIGRAM(S): 20 TABLET, DELAYED RELEASE ORAL at 05:55

## 2024-04-23 RX ADMIN — OXYCODONE HYDROCHLORIDE 5 MILLIGRAM(S): 5 TABLET ORAL at 15:30

## 2024-04-23 NOTE — PROGRESS NOTE ADULT - SUBJECTIVE AND OBJECTIVE BOX
PROGRESS NOTE:   Authored by Dr. Juan Manuel Craig MD (PGY-1). Pager [facility] [phone] / [facility]     Patient is a 83y old  Male who presents with a chief complaint of Wound Check (23 Apr 2024 07:01)      SUBJECTIVE / OVERNIGHT EVENTS:  No acute events overnight.     ADDITIONAL REVIEW OF SYSTEMS:  Patient denies fevers, chills, chest pain, shortness of breath, nausea, abdominal pain, diarrhea, constipation, dysuria, leg swelling, headache, light headedness.    MEDICATIONS  (STANDING):  chlorhexidine 4% Liquid 1 Application(s) Topical daily  Dakins Solution - 1/2 Strength 1 Application(s) Topical daily  ferrous    sulfate 325 milliGRAM(s) Oral daily  gabapentin 300 milliGRAM(s) Oral three times a day  heparin   Injectable 5000 Unit(s) SubCutaneous every 12 hours  metoprolol tartrate 12.5 milliGRAM(s) Oral two times a day  mupirocin 2% Nasal 1 Application(s) Both Nostrils two times a day  pantoprazole    Tablet 40 milliGRAM(s) Oral before breakfast    MEDICATIONS  (PRN):  acetaminophen     Tablet .. 650 milliGRAM(s) Oral every 6 hours PRN Temp greater or equal to 38C (100.4F), Mild Pain (1 - 3)  aluminum hydroxide/magnesium hydroxide/simethicone Suspension 30 milliLiter(s) Oral every 4 hours PRN Dyspepsia  melatonin 3 milliGRAM(s) Oral at bedtime PRN Insomnia  ondansetron Injectable 4 milliGRAM(s) IV Push every 8 hours PRN Nausea and/or Vomiting  oxyCODONE    IR 2.5 milliGRAM(s) Oral every 6 hours PRN Moderate Pain (4 - 6)  oxyCODONE    IR 5 milliGRAM(s) Oral every 6 hours PRN Severe Pain (7 - 10)      CAPILLARY BLOOD GLUCOSE        I&O's Summary    22 Apr 2024 07:01  -  23 Apr 2024 07:00  --------------------------------------------------------  IN: 400 mL / OUT: 1000 mL / NET: -600 mL    23 Apr 2024 07:01  -  23 Apr 2024 11:41  --------------------------------------------------------  IN: 200 mL / OUT: 0 mL / NET: 200 mL        PHYSICAL EXAM:  Vital Signs Last 24 Hrs  T(C): 36.4 (23 Apr 2024 06:22), Max: 36.7 (22 Apr 2024 12:15)  T(F): 97.6 (23 Apr 2024 06:22), Max: 98.1 (22 Apr 2024 12:15)  HR: 71 (23 Apr 2024 06:22) (71 - 85)  BP: 120/52 (23 Apr 2024 06:22) (97/47 - 133/76)  BP(mean): --  RR: 18 (23 Apr 2024 06:22) (18 - 18)  SpO2: 96% (23 Apr 2024 06:22) (96% - 98%)    Parameters below as of 23 Apr 2024 06:22  Patient On (Oxygen Delivery Method): room air    GENERAL: NAD, lying comfortably in bed  HEAD: Atraumatic, normocephalic  EYES: EOMI b/l PERRLA b/l, conjunctiva and sclera clear  NECK: Supple, No JVD, No LAD  RESPIRATORY: Normal respiratory effort; lungs are clear to auscultation bilaterally  CARDIOVASCULAR: Regular rate and rhythm, normal S1 and S2, no murmur/rub/gallop; No lower extremity edema  ABDOMEN: Nontender, normoactive bowel sounds, no rebound/guarding; No hepatosplenomegaly  MUSCULOSKELETAL: bilateral lower extremity swelling, lichenified; non tender.   NEURO: Non focal   PSYCH: A+O to person, place, and time; affect appropriate    LABS:                        9.0    5.14  )-----------( 293      ( 23 Apr 2024 09:05 )             27.6     04-22    138  |  102  |  5<L>  ----------------------------<  132<H>  3.7   |  27  |  0.34<L>    Ca    9.1      22 Apr 2024 10:39        Urinalysis Basic - ( 22 Apr 2024 10:39 )    Color: x / Appearance: x / SG: x / pH: x  Gluc: 132 mg/dL / Ketone: x  / Bili: x / Urobili: x   Blood: x / Protein: x / Nitrite: x   Leuk Esterase: x / RBC: x / WBC x   Sq Epi: x / Non Sq Epi: x / Bacteria: x          Tele Reviewed:    RADIOLOGY & ADDITIONAL TESTS:  Results Reviewed:   Imaging Personally Reviewed:  Electrocardiogram Personally Reviewed:

## 2024-04-23 NOTE — PROGRESS NOTE ADULT - PROBLEM SELECTOR PLAN 6
Pt did not know name of all his meds or dosages, will need f/u   Please confirm home meds in AM Pt did not know name of all his meds or dosages, will need f/u   Please confirm home meds

## 2024-04-23 NOTE — PROGRESS NOTE ADULT - PROBLEM SELECTOR PLAN 2
Hg  9.8  (bL unclear, no prior in chart )   - Ddx: AoCD vs NORAH   - Check iron studies, b12, folate  - NORAH; oral iron Hg  9.8  (bL unclear, no prior in chart )   - Ddx: AoCD vs NORAH   - NORAH; oral iron

## 2024-04-23 NOTE — PROGRESS NOTE ADULT - ATTENDING COMMENTS
84yo M pmh HTN, chronic LE lymphedema, wheelchair bound with b/l leg contractures after falling from ladder years ago, presents 4/16 for concern of chronic osteomyelitis.    d/w patient at bedside still declining bone biopsy and MRI  amenable to going up and following up outpatient if he should change his mind    -MRI RLE: Osteomyelitis involving the calcaneus and the proximal fifth metatarsal, adjacent to soft tissue wounds  need pods wound care recs   RLE wound Cx: proteus, MRSA, VRE  Podiatry following - s/p B/L wound debridements w/ graft applications on 2/21 at [facility] where he was d/c on 6weeks of IV abx 2/2 high concern for infection. His previous intra-op culture grew pseudomonas in which it was adjunctly treated w/ PO Cipro.   Given the nature of his contracted limbs, and inability to ambulate, patient is at high risk for limb loss.   Podiatry team discussed option of possible bone biopsy of the calc and possible further debridements, however patient is strongly against any procedures or intervention at this time.  agree with ID monitor off of abx    -SUKH/PVR: The right SUKH of 1.39 and the left SUKH of 1.29 are normal. 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  f/u vascular cards - attempted Arterial Duplex of B/L LE extremities but unable to tolerated.  opt vasc     PT  dc planning home

## 2024-04-24 LAB
ALBUMIN SERPL ELPH-MCNC: 2.8 G/DL — LOW (ref 3.3–5)
ALP SERPL-CCNC: 65 U/L — SIGNIFICANT CHANGE UP (ref 40–120)
ALT FLD-CCNC: 9 U/L — LOW (ref 10–45)
ANION GAP SERPL CALC-SCNC: 7 MMOL/L — SIGNIFICANT CHANGE UP (ref 5–17)
AST SERPL-CCNC: 8 U/L — LOW (ref 10–40)
BILIRUB SERPL-MCNC: 0.5 MG/DL — SIGNIFICANT CHANGE UP (ref 0.2–1.2)
BUN SERPL-MCNC: 6 MG/DL — LOW (ref 7–23)
CALCIUM SERPL-MCNC: 8.9 MG/DL — SIGNIFICANT CHANGE UP (ref 8.4–10.5)
CHLORIDE SERPL-SCNC: 103 MMOL/L — SIGNIFICANT CHANGE UP (ref 96–108)
CO2 SERPL-SCNC: 27 MMOL/L — SIGNIFICANT CHANGE UP (ref 22–31)
CREAT SERPL-MCNC: 0.31 MG/DL — LOW (ref 0.5–1.3)
EGFR: 118 ML/MIN/1.73M2 — SIGNIFICANT CHANGE UP
GLUCOSE SERPL-MCNC: 97 MG/DL — SIGNIFICANT CHANGE UP (ref 70–99)
MAGNESIUM SERPL-MCNC: 1.6 MG/DL — SIGNIFICANT CHANGE UP (ref 1.6–2.6)
PHOSPHATE SERPL-MCNC: 2.5 MG/DL — SIGNIFICANT CHANGE UP (ref 2.5–4.5)
POTASSIUM SERPL-MCNC: 3.6 MMOL/L — SIGNIFICANT CHANGE UP (ref 3.5–5.3)
POTASSIUM SERPL-SCNC: 3.6 MMOL/L — SIGNIFICANT CHANGE UP (ref 3.5–5.3)
PROT SERPL-MCNC: 7.3 G/DL — SIGNIFICANT CHANGE UP (ref 6–8.3)
SODIUM SERPL-SCNC: 137 MMOL/L — SIGNIFICANT CHANGE UP (ref 135–145)

## 2024-04-24 PROCEDURE — 99232 SBSQ HOSP IP/OBS MODERATE 35: CPT | Mod: GC

## 2024-04-24 RX ADMIN — GABAPENTIN 300 MILLIGRAM(S): 400 CAPSULE ORAL at 06:45

## 2024-04-24 RX ADMIN — Medication 1 APPLICATION(S): at 11:29

## 2024-04-24 RX ADMIN — OXYCODONE HYDROCHLORIDE 2.5 MILLIGRAM(S): 5 TABLET ORAL at 07:32

## 2024-04-24 RX ADMIN — GABAPENTIN 300 MILLIGRAM(S): 400 CAPSULE ORAL at 14:49

## 2024-04-24 RX ADMIN — OXYCODONE HYDROCHLORIDE 5 MILLIGRAM(S): 5 TABLET ORAL at 02:16

## 2024-04-24 RX ADMIN — OXYCODONE HYDROCHLORIDE 5 MILLIGRAM(S): 5 TABLET ORAL at 03:15

## 2024-04-24 RX ADMIN — HEPARIN SODIUM 5000 UNIT(S): 5000 INJECTION INTRAVENOUS; SUBCUTANEOUS at 17:45

## 2024-04-24 RX ADMIN — Medication 12.5 MILLIGRAM(S): at 06:44

## 2024-04-24 RX ADMIN — OXYCODONE HYDROCHLORIDE 2.5 MILLIGRAM(S): 5 TABLET ORAL at 06:42

## 2024-04-24 RX ADMIN — OXYCODONE HYDROCHLORIDE 5 MILLIGRAM(S): 5 TABLET ORAL at 15:50

## 2024-04-24 RX ADMIN — HEPARIN SODIUM 5000 UNIT(S): 5000 INJECTION INTRAVENOUS; SUBCUTANEOUS at 06:45

## 2024-04-24 RX ADMIN — OXYCODONE HYDROCHLORIDE 5 MILLIGRAM(S): 5 TABLET ORAL at 14:48

## 2024-04-24 RX ADMIN — GABAPENTIN 300 MILLIGRAM(S): 400 CAPSULE ORAL at 22:15

## 2024-04-24 RX ADMIN — Medication 12.5 MILLIGRAM(S): at 17:46

## 2024-04-24 RX ADMIN — Medication 325 MILLIGRAM(S): at 11:29

## 2024-04-24 RX ADMIN — PANTOPRAZOLE SODIUM 40 MILLIGRAM(S): 20 TABLET, DELAYED RELEASE ORAL at 06:45

## 2024-04-24 NOTE — PROGRESS NOTE ADULT - SUBJECTIVE AND OBJECTIVE BOX
PROGRESS NOTE:   Authored by Dr. Juan Manuel Craig MD (PGY-1). Pager [facility] [phone] / [facility]     Patient is a 83y old  Male who presents with a chief complaint of Wound Check (23 Apr 2024 07:01)      SUBJECTIVE / OVERNIGHT EVENTS:  No acute events overnight.     ADDITIONAL REVIEW OF SYSTEMS:  Patient denies fevers, chills, chest pain, shortness of breath, nausea, abdominal pain, diarrhea, constipation, dysuria, leg swelling, headache, light headedness.    MEDICATIONS  (STANDING):  chlorhexidine 4% Liquid 1 Application(s) Topical daily  Dakins Solution - 1/2 Strength 1 Application(s) Topical daily  ferrous    sulfate 325 milliGRAM(s) Oral daily  gabapentin 300 milliGRAM(s) Oral three times a day  heparin   Injectable 5000 Unit(s) SubCutaneous every 12 hours  metoprolol tartrate 12.5 milliGRAM(s) Oral two times a day  pantoprazole    Tablet 40 milliGRAM(s) Oral before breakfast    MEDICATIONS  (PRN):  acetaminophen     Tablet .. 650 milliGRAM(s) Oral every 6 hours PRN Temp greater or equal to 38C (100.4F), Mild Pain (1 - 3)  aluminum hydroxide/magnesium hydroxide/simethicone Suspension 30 milliLiter(s) Oral every 4 hours PRN Dyspepsia  melatonin 3 milliGRAM(s) Oral at bedtime PRN Insomnia  ondansetron Injectable 4 milliGRAM(s) IV Push every 8 hours PRN Nausea and/or Vomiting  oxyCODONE    IR 2.5 milliGRAM(s) Oral every 6 hours PRN Moderate Pain (4 - 6)  oxyCODONE    IR 5 milliGRAM(s) Oral every 6 hours PRN Severe Pain (7 - 10)      CAPILLARY BLOOD GLUCOSE        I&O's Summary    23 Apr 2024 07:01  -  24 Apr 2024 07:00  --------------------------------------------------------  IN: 440 mL / OUT: 1300 mL / NET: -860 mL        PHYSICAL EXAM:  Vital Signs Last 24 Hrs  T(C): 36.7 (24 Apr 2024 06:00), Max: 36.7 (24 Apr 2024 06:00)  T(F): 98.1 (24 Apr 2024 06:00), Max: 98.1 (24 Apr 2024 06:00)  HR: 73 (24 Apr 2024 06:00) (73 - 73)  BP: 155/66 (24 Apr 2024 06:00) (155/66 - 155/66)  BP(mean): --  RR: 18 (24 Apr 2024 06:00) (18 - 18)  SpO2: 98% (24 Apr 2024 06:00) (98% - 98%)    Parameters below as of 24 Apr 2024 06:00  Patient On (Oxygen Delivery Method): room air        CONSTITUTIONAL: NAD, well-developed  RESPIRATORY: Normal respiratory effort; lungs are clear to auscultation bilaterally  CARDIOVASCULAR: Regular rate and rhythm, normal S1 and S2, no murmur/rub/gallop; No lower extremity edema; Peripheral pulses are 2+ bilaterally  ABDOMEN: Nontender to palpation, normoactive bowel sounds, no rebound/guarding; No hepatosplenomegaly  MUSCLOSKELETAL: no clubbing or cyanosis of digits; no joint swelling or tenderness to palpation  PSYCH: A+O to person, place, and time; affect appropriate    LABS:                        9.0    5.14  )-----------( 293      ( 23 Apr 2024 09:05 )             27.6     04-22    138  |  102  |  5<L>  ----------------------------<  132<H>  3.7   |  27  |  0.34<L>    Ca    9.1      22 Apr 2024 10:39            Urinalysis Basic - ( 22 Apr 2024 10:39 )    Color: x / Appearance: x / SG: x / pH: x  Gluc: 132 mg/dL / Ketone: x  / Bili: x / Urobili: x   Blood: x / Protein: x / Nitrite: x   Leuk Esterase: x / RBC: x / WBC x   Sq Epi: x / Non Sq Epi: x / Bacteria: x          Tele Reviewed:    RADIOLOGY & ADDITIONAL TESTS:  Results Reviewed:   Imaging Personally Reviewed:  Electrocardiogram Personally Reviewed:     PROGRESS NOTE:   Authored by Dr. Juan Manuel Craig MD (PGY-1). Pager [facility] [phone] / [facility]     Patient is a 83y old  Male who presents with a chief complaint of Wound Check (23 Apr 2024 07:01)      SUBJECTIVE / OVERNIGHT EVENTS:  No acute events overnight.     ADDITIONAL REVIEW OF SYSTEMS:  Patient denies fevers, chills, chest pain, shortness of breath, nausea, abdominal pain, diarrhea, constipation, dysuria, leg swelling, headache, light headedness.    MEDICATIONS  (STANDING):  chlorhexidine 4% Liquid 1 Application(s) Topical daily  Dakins Solution - 1/2 Strength 1 Application(s) Topical daily  ferrous    sulfate 325 milliGRAM(s) Oral daily  gabapentin 300 milliGRAM(s) Oral three times a day  heparin   Injectable 5000 Unit(s) SubCutaneous every 12 hours  metoprolol tartrate 12.5 milliGRAM(s) Oral two times a day  pantoprazole    Tablet 40 milliGRAM(s) Oral before breakfast    MEDICATIONS  (PRN):  acetaminophen     Tablet .. 650 milliGRAM(s) Oral every 6 hours PRN Temp greater or equal to 38C (100.4F), Mild Pain (1 - 3)  aluminum hydroxide/magnesium hydroxide/simethicone Suspension 30 milliLiter(s) Oral every 4 hours PRN Dyspepsia  melatonin 3 milliGRAM(s) Oral at bedtime PRN Insomnia  ondansetron Injectable 4 milliGRAM(s) IV Push every 8 hours PRN Nausea and/or Vomiting  oxyCODONE    IR 2.5 milliGRAM(s) Oral every 6 hours PRN Moderate Pain (4 - 6)  oxyCODONE    IR 5 milliGRAM(s) Oral every 6 hours PRN Severe Pain (7 - 10)      CAPILLARY BLOOD GLUCOSE        I&O's Summary    23 Apr 2024 07:01  -  24 Apr 2024 07:00  --------------------------------------------------------  IN: 440 mL / OUT: 1300 mL / NET: -860 mL        PHYSICAL EXAM:  Vital Signs Last 24 Hrs  T(C): 36.7 (24 Apr 2024 06:00), Max: 36.7 (24 Apr 2024 06:00)  T(F): 98.1 (24 Apr 2024 06:00), Max: 98.1 (24 Apr 2024 06:00)  HR: 73 (24 Apr 2024 06:00) (73 - 73)  BP: 155/66 (24 Apr 2024 06:00) (155/66 - 155/66)  BP(mean): --  RR: 18 (24 Apr 2024 06:00) (18 - 18)  SpO2: 98% (24 Apr 2024 06:00) (98% - 98%)    Parameters below as of 24 Apr 2024 06:00  Patient On (Oxygen Delivery Method): room air    GENERAL: NAD, lying comfortably in bed  HEAD: Atraumatic, normocephalic  EYES: EOMI b/l PERRLA b/l, conjunctiva and sclera clear  NECK: Supple, No JVD, No LAD  RESPIRATORY: Normal respiratory effort; lungs are clear to auscultation bilaterally  CARDIOVASCULAR: Regular rate and rhythm, normal S1 and S2, no murmur/rub/gallop; No lower extremity edema  ABDOMEN: Nontender, normoactive bowel sounds, no rebound/guarding; No hepatosplenomegaly  MUSCULOSKELETAL: bilateral lower extremity swelling; lichenified; non tender.   NEURO: Non focal   PSYCH: A+O to person, place, and time; affect appropriate    LABS:                        9.0    5.14  )-----------( 293      ( 23 Apr 2024 09:05 )             27.6     04-22    138  |  102  |  5<L>  ----------------------------<  132<H>  3.7   |  27  |  0.34<L>    Ca    9.1      22 Apr 2024 10:39        Urinalysis Basic - ( 22 Apr 2024 10:39 )    Color: x / Appearance: x / SG: x / pH: x  Gluc: 132 mg/dL / Ketone: x  / Bili: x / Urobili: x   Blood: x / Protein: x / Nitrite: x   Leuk Esterase: x / RBC: x / WBC x   Sq Epi: x / Non Sq Epi: x / Bacteria: x      Tele Reviewed:    RADIOLOGY & ADDITIONAL TESTS:  Results Reviewed:   Imaging Personally Reviewed:  Electrocardiogram Personally Reviewed:

## 2024-04-24 NOTE — PROGRESS NOTE ADULT - PROBLEM SELECTOR PLAN 2
Chronic lymphedema; Pt wheelchair bound with contracted and lichenified LE  - Vascular seen pt and recommended moisturizer and compressive wraps

## 2024-04-24 NOTE — PROGRESS NOTE ADULT - ATTENDING COMMENTS
82yo M pmh HTN, chronic LE lymphedema, wheelchair bound with b/l leg contractures after falling from ladder years ago, presents 4/16 for concern of chronic osteomyelitis.    -MRI RLE: Osteomyelitis involving the calcaneus and the proximal fifth metatarsal, adjacent to soft tissue wounds  Pending MRI LLE.   RLE wound Cx: proteus, MRSA, VRE  Podiatry following - s/p B/L wound debridements w/ graft applications on 2/21 at [facility] where he was d/c on 6weeks of IV abx 2/2 high concern for infection. His previous intra-op culture grew pseudomonas in which it was adjunctly treated w/ PO Cipro.   Given the nature of his contracted limbs, and inability to ambulate, patient is at high risk for limb loss.   Podiatry team discussed option of possible bone biopsy of the calc and possible further debridements, however patient is strongly against any procedures or intervention at this time.  appreciate ID input    -SUKH/PVR: The right SUKH of 1.39 and the left SUKH of 1.29 are normal. 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  f/u vascular cards - attempted Arterial Duplex of B/L LE extremities but unable to tolerated  opt vascular surgery    dc planning in arrangment with CM has TC aids- med ready for dc   Time-based billing (NON-critical care).     45 minutes spent on total encounter. The necessity of the time spent during the encounter on this date of service was due to:     reviewing documentation/labs/imaging, interviewing and examining patient, documentation, coordinating care with ACP/CM/Specialists.

## 2024-04-25 ENCOUNTER — TRANSCRIPTION ENCOUNTER (OUTPATIENT)
Age: 84
End: 2024-04-25

## 2024-04-25 PROCEDURE — 99232 SBSQ HOSP IP/OBS MODERATE 35: CPT | Mod: GC

## 2024-04-25 RX ADMIN — GABAPENTIN 300 MILLIGRAM(S): 400 CAPSULE ORAL at 23:11

## 2024-04-25 RX ADMIN — Medication 1 APPLICATION(S): at 11:49

## 2024-04-25 RX ADMIN — GABAPENTIN 300 MILLIGRAM(S): 400 CAPSULE ORAL at 06:42

## 2024-04-25 RX ADMIN — Medication 325 MILLIGRAM(S): at 11:49

## 2024-04-25 RX ADMIN — Medication 12.5 MILLIGRAM(S): at 19:24

## 2024-04-25 RX ADMIN — OXYCODONE HYDROCHLORIDE 2.5 MILLIGRAM(S): 5 TABLET ORAL at 23:41

## 2024-04-25 RX ADMIN — OXYCODONE HYDROCHLORIDE 5 MILLIGRAM(S): 5 TABLET ORAL at 11:48

## 2024-04-25 RX ADMIN — OXYCODONE HYDROCHLORIDE 2.5 MILLIGRAM(S): 5 TABLET ORAL at 23:11

## 2024-04-25 RX ADMIN — CHLORHEXIDINE GLUCONATE 1 APPLICATION(S): 213 SOLUTION TOPICAL at 23:13

## 2024-04-25 RX ADMIN — PANTOPRAZOLE SODIUM 40 MILLIGRAM(S): 20 TABLET, DELAYED RELEASE ORAL at 06:42

## 2024-04-25 NOTE — PROGRESS NOTE ADULT - ATTENDING COMMENTS
84yo M pmh HTN, chronic LE lymphedema, wheelchair bound with b/l leg contractures after falling from ladder years ago, presents 4/16 for concern of chronic osteomyelitis.    -MRI RLE: Osteomyelitis involving the calcaneus and the proximal fifth metatarsal, adjacent to soft tissue wounds  Pending MRI LLE.   RLE wound Cx: proteus, MRSA, VRE  Podiatry following - s/p B/L wound debridements w/ graft applications on 2/21 at [facility] where he was d/c on 6weeks of IV abx 2/2 high concern for infection. His previous intra-op culture grew pseudomonas in which it was adjunctly treated w/ PO Cipro.   Given the nature of his contracted limbs, and inability to ambulate, patient is at high risk for limb loss.   Podiatry team discussed option of possible bone biopsy of the calc and possible further debridements, however patient is strongly against any procedures or intervention at this time.  appreciate ID input    -SUKH/PVR: The right SUKH of 1.39 and the left SUKH of 1.29 are normal. 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  f/u vascular cards - attempted Arterial Duplex of B/L LE extremities but unable to tolerated  opt vascular surgery    dc planning in arrangment with CM has [facility] aids- med ready for dc 84yo M pmh HTN, chronic LE lymphedema, wheelchair bound with b/l leg contractures after falling from ladder years ago, presents 4/16 for concern of chronic osteomyelitis.    -MRI RLE: Osteomyelitis involving the calcaneus and the proximal fifth metatarsal, adjacent to soft tissue wounds  Pending MRI LLE.   RLE wound Cx: proteus, MRSA, VRE  Podiatry following - s/p B/L wound debridements w/ graft applications on 2/21 at [facility] where he was d/c on 6weeks of IV abx 2/2 high concern for infection. His previous intra-op culture grew pseudomonas in which it was adjunctly treated w/ PO Cipro.   Given the nature of his contracted limbs, and inability to ambulate, patient is at high risk for limb loss.   Podiatry team discussed option of possible bone biopsy of the calc and possible further debridements, however patient is strongly against any procedures or intervention at this time.  appreciate ID input    -SUKH/PVR: The right SUKH of 1.39 and the left SUKH of 1.29 are normal. 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  f/u vascular cards - attempted Arterial Duplex of B/L LE extremities but unable to tolerated  opt vascular surgery    dc planning in arrangement with CM has [facility] aids- med ready for dc  dc time 55 min

## 2024-04-25 NOTE — PROGRESS NOTE ADULT - PROBLEM SELECTOR PLAN 7
Pt did not know name of all his meds or dosages, will need f/u; unable to contact home aides at his time
Pt did not know name of all his meds or dosages, will need f/u; unable to contact home aides at his time

## 2024-04-25 NOTE — PROGRESS NOTE ADULT - PROBLEM SELECTOR PLAN 5
- DVT ppx: Heparin SQ   - Diet: DASH/ CC  - PT consult
- Denies coagulopathy  - No prior reaction to anesthesia  - Non smoker   -  Unable to perform > 4mets (w/c bound, has HHA)   - RCRI: 0  - EKG sinus   - cbc mild anemia & cmp largely unremarkable    - patient is low risk for low risk procedure,  pt is medically optimized provided   labs remain stable w/no clinical decompensation
- DVT ppx: Heparin SQ   - Diet: DASH/ CC  - PT consult
- Denies coagulopathy  - No prior reaction to anesthesia  - Non smoker   -  Unable to perform > 4mets (w/c bound, has HHA)   - RCRI: 0  - EKG sinus   - cbc mild anemia & cmp largely unremarkable    - patient is low risk for low risk procedure,  pt is medically optimized provided   labs remain stable w/no clinical decompensation

## 2024-04-25 NOTE — DISCHARGE NOTE NURSING/CASE MANAGEMENT/SOCIAL WORK - NSDCFUADDAPPT_GEN_ALL_CORE_FT
Podiatry Discharge Instructions:  Follow up: Please follow up with Dr. Catalan at the wound care center at [facility] within 1 week of discharge from the hospital, please call [phone] for appointment and discuss that you recently were seen in the hospital.  Wound Care: Please apply santyl, followed by aqaucel to the both heels, followed by 4x4 gauze, and ABD pad, and naomi. Please off load the heel at ALL times with off-loading shoes while not ambulating. Patient requires changes in dressings 3 times a week.   Weight bearing: Please off load the heel at ALL times with off-loading shoes while not ambulating.   Antibiotics: Please continue as instructed.

## 2024-04-25 NOTE — PROGRESS NOTE ADULT - PROBLEM SELECTOR PLAN 1
- P/w  worsening pain with bleeding throughout the sole of the right foot  x 1wk   - Afebrile, VSS, clinically nontoxic   - Labs: Trop 34, , CRP 36 (elevated)   - Rt foot XR: no fracture or dislocation. Age-indeterminate erosion of the fifth metatarsal base  - Lt foot XR: Plantar calcaneal and possibly plantar 5th metatarsal base osteomyelitis.   - MR R foot: confirmed osteo  - Pt refusing L foot MRI  - Eval by Podiatry, foot wound cleansed and dressed by Podiatry   - IV abx  - F/u Bcx , Ucx, Abscess Gm stain , ESR   - SUKH/PVR ordered   - Vascular consulted; suggested BNP, echo, wraps, mosturizer, b/l arterial duplex, wound care cnslt  - Podiatry: plan local wound care vs bilateral wounds debridement pending imaging /vasc recs
- P/w  worsening pain with bleeding throughout the sole of the right foot  x 1wk   - Afebrile, VSS, clinically nontoxic   - Labs: Trop 34, , CRP 36 (elevated)   - Rt foot XR: no fracture or dislocation. Age-indeterminate erosion of the fifth metatarsal base  - Eval by Podiatry, foot wound cleansed and dressed by Podiatry   - IV abx  - F/u Bcx , Ucx, Abscess Gm stain , ESR   - F/u XR left foot   - SUKH/PVR ordered   - Vascular consulted; suggested BNP, echo, wraps, mosturizer, b/l arterial duplex, wound care cnslt  - Podiatry: plan local wound care vs bilateral wounds debridement pending imaging /vasc recs
- P/w  worsening pain with bleeding throughout the sole of the right foot  x 1wk   - Afebrile, VSS, clinically nontoxic   - Labs: Trop 34, , CRP 36 (elevated)   - Rt foot XR: no fracture or dislocation. Age-indeterminate erosion of the fifth metatarsal base  - Lt foot XR: Plantar calcaneal and possibly plantar 5th metatarsal base osteomyelitis.   - MR b/l foot pend  - Eval by Podiatry, foot wound cleansed and dressed by Podiatry   - IV abx  - F/u Bcx , Ucx, Abscess Gm stain , ESR   - SUKH/PVR ordered   - Vascular consulted; suggested BNP, echo, wraps, mosturizer, b/l arterial duplex, wound care cnslt  - Podiatry: plan local wound care vs bilateral wounds debridement pending imaging /vasc recs
- P/w  worsening pain with bleeding throughout the sole of the right foot  x 1wk   - Afebrile, VSS, clinically nontoxic   - Labs: Trop 34, , CRP 36 (elevated)   - Rt foot XR: no fracture or dislocation. Age-indeterminate erosion of the fifth metatarsal base  - Lt foot XR: Plantar calcaneal and possibly plantar 5th metatarsal base osteomyelitis.   - MR b/l foot pend  - Eval by Podiatry, foot wound cleansed and dressed by Podiatry   - IV abx  - F/u Bcx , Ucx, Abscess Gm stain , ESR   - SUKH/PVR ordered   - Vascular consulted; suggested BNP, echo, wraps, mosturizer, b/l arterial duplex, wound care cnslt  - Podiatry: plan local wound care vs bilateral wounds debridement pending imaging /vasc recs
- P/w  worsening pain with bleeding throughout the sole of the right foot  x 1wk   - Afebrile, VSS, clinically nontoxic   - Labs: Trop 34, , CRP 36 (elevated)   - Rt foot XR: no fracture or dislocation. Age-indeterminate erosion of the fifth metatarsal base  - Lt foot XR: Plantar calcaneal and possibly plantar 5th metatarsal base osteomyelitis.   - MR R foot: confirmed osteo  - Pt refusing L foot MRI  - Eval by Podiatry, foot wound cleansed and dressed by Podiatry   - ID: Pt completed 6wks of IV abx so at his farshad ID feels further abx would offer limied benefit  and isn't warranted.          Bcx , Ucx, Abscess Cx , ESR   - Vascular: BNP, echo, wraps, moisturizer, b/l arterial duplex, wound care cnslt  - Podiatry: plan local wound care; pt refused bilateral debridement or further surgical mgmt; advised on risks of deferring care
- P/w  worsening pain with bleeding throughout the sole of the right foot  x 1wk   - Afebrile, VSS, clinically nontoxic   - Labs: Trop 34, , CRP 36 (elevated)   - Rt foot XR: no fracture or dislocation. Age-indeterminate erosion of the fifth metatarsal base  - Lt foot XR: Plantar calcaneal and possibly plantar 5th metatarsal base osteomyelitis.   - MR b/l foot pend  - Eval by Podiatry, foot wound cleansed and dressed by Podiatry   - IV abx  - F/u Bcx , Ucx, Abscess Gm stain , ESR   - SUKH/PVR ordered   - Vascular consulted; suggested BNP, echo, wraps, mosturizer, b/l arterial duplex, wound care cnslt  - Podiatry: plan local wound care vs bilateral wounds debridement pending imaging /vasc recs
- P/w  worsening pain with bleeding throughout the sole of the right foot  x 1wk   - Afebrile, VSS, clinically nontoxic   - Labs: Trop 34, , CRP 36 (elevated)   - Rt foot XR: no fracture or dislocation. Age-indeterminate erosion of the fifth metatarsal base  - Lt foot XR: Plantar calcaneal and possibly plantar 5th metatarsal base osteomyelitis.   - MR R foot: confirmed osteo  - Pt refusing L foot MRI  - Eval by Podiatry, foot wound cleansed and dressed by Podiatry   - ID: Pt completed 6wks of IV abx so at his farshad ID feels further abx would offer limied benefit  and isn't warranted.          Bcx , Ucx, Abscess Cx , ESR   - Vascular: BNP, echo, wraps, moisturizer, b/l arterial duplex, wound care cnslt  - Podiatry: plan local wound care; pt refused bilateral debridement or further surgical mgmt; advised on risks of deferring care
- P/w  worsening pain with bleeding throughout the sole of the right foot  x 1wk   - Afebrile, VSS, clinically nontoxic   - Labs: Trop 34, , CRP 36 (elevated)   - Rt foot XR: no fracture or dislocation. Age-indeterminate erosion of the fifth metatarsal base  - Eval by Podiatry, foot wound cleansed and dressed by Podiatry   - IV abx  - F/u Bcx , Ucx, Abscess Gm stain , ESR   - F/u XR left foot   - SUKH/PVR ordered   - Vascular consult (ordered) f/u in AM   - Podiatry: plan local wound care vs bilateral wounds debridement pending imaging /vasc recs

## 2024-04-25 NOTE — PROGRESS NOTE ADULT - PROBLEM SELECTOR PROBLEM 4
Preoperative examination
HTN (hypertension)
Preoperative examination
HTN (hypertension)

## 2024-04-25 NOTE — PROGRESS NOTE ADULT - SUBJECTIVE AND OBJECTIVE BOX
PROGRESS NOTE:   Authored by Dr. Juan Manuel Craig MD (PGY-1). Pager [facility] [phone] / [facility]     Patient is a 83y old  Male who presents with a chief complaint of Wound Check (24 Apr 2024 07:50)      SUBJECTIVE / OVERNIGHT EVENTS:  No acute events overnight.     ADDITIONAL REVIEW OF SYSTEMS:  Patient denies fevers, chills, chest pain, shortness of breath, nausea, abdominal pain, diarrhea, constipation, dysuria, leg swelling, headache, light headedness.    MEDICATIONS  (STANDING):  chlorhexidine 4% Liquid 1 Application(s) Topical daily  Dakins Solution - 1/2 Strength 1 Application(s) Topical daily  ferrous    sulfate 325 milliGRAM(s) Oral daily  gabapentin 300 milliGRAM(s) Oral three times a day  heparin   Injectable 5000 Unit(s) SubCutaneous every 12 hours  metoprolol tartrate 12.5 milliGRAM(s) Oral two times a day  pantoprazole    Tablet 40 milliGRAM(s) Oral before breakfast    MEDICATIONS  (PRN):  acetaminophen     Tablet .. 650 milliGRAM(s) Oral every 6 hours PRN Temp greater or equal to 38C (100.4F), Mild Pain (1 - 3)  aluminum hydroxide/magnesium hydroxide/simethicone Suspension 30 milliLiter(s) Oral every 4 hours PRN Dyspepsia  melatonin 3 milliGRAM(s) Oral at bedtime PRN Insomnia  ondansetron Injectable 4 milliGRAM(s) IV Push every 8 hours PRN Nausea and/or Vomiting  oxyCODONE    IR 2.5 milliGRAM(s) Oral every 6 hours PRN Moderate Pain (4 - 6)  oxyCODONE    IR 5 milliGRAM(s) Oral every 6 hours PRN Severe Pain (7 - 10)      CAPILLARY BLOOD GLUCOSE        I&O's Summary    24 Apr 2024 07:01  -  25 Apr 2024 07:00  --------------------------------------------------------  IN: 240 mL / OUT: 850 mL / NET: -610 mL        PHYSICAL EXAM:  Vital Signs Last 24 Hrs  T(C): 36.7 (25 Apr 2024 06:33), Max: 36.7 (25 Apr 2024 06:33)  T(F): 98 (25 Apr 2024 06:33), Max: 98 (25 Apr 2024 06:33)  HR: 71 (25 Apr 2024 06:33) (71 - 79)  BP: 92/49 (25 Apr 2024 06:33) (92/49 - 136/74)  BP(mean): --  RR: 16 (25 Apr 2024 06:33) (16 - 18)  SpO2: 96% (25 Apr 2024 06:33) (96% - 100%)    Parameters below as of 25 Apr 2024 06:33  Patient On (Oxygen Delivery Method): room air      GENERAL: NAD, lying comfortably in bed  HEAD: Atraumatic, normocephalic  EYES: EOMI b/l PERRLA b/l, conjunctiva and sclera clear  NECK: Supple, No JVD, No LAD  RESPIRATORY: Normal respiratory effort; lungs are clear to auscultation bilaterally  CARDIOVASCULAR: Regular rate and rhythm, normal S1 and S2, no murmur/rub/gallop; No lower extremity edema  ABDOMEN: Nontender, normoactive bowel sounds, no rebound/guarding; No hepatosplenomegaly  MUSCULOSKELETAL: bilateral lower extremity swelling, lichenified; non tender.   NEURO: Non focal   PSYCH: A+O to person, place, and time; affect appropriate      CONSTITUTIONAL: NAD, well-developed  RESPIRATORY: Normal respiratory effort; lungs are clear to auscultation bilaterally  CARDIOVASCULAR: Regular rate and rhythm, normal S1 and S2, no murmur/rub/gallop; No lower extremity edema; Peripheral pulses are 2+ bilaterally  ABDOMEN: Nontender to palpation, normoactive bowel sounds, no rebound/guarding; No hepatosplenomegaly  MUSCLOSKELETAL: no clubbing or cyanosis of digits; no joint swelling or tenderness to palpation  PSYCH: A+O to person, place, and time; affect appropriate    LABS:    04-24    137  |  103  |  6<L>  ----------------------------<  97  3.6   |  27  |  0.31<L>    Ca    8.9      24 Apr 2024 13:15  Phos  2.5     04-24  Mg     1.6     04-24    TPro  7.3  /  Alb  2.8<L>  /  TBili  0.5  /  DBili  x   /  AST  8<L>  /  ALT  9<L>  /  AlkPhos  65  04-24          Urinalysis Basic - ( 24 Apr 2024 13:15 )    Color: x / Appearance: x / SG: x / pH: x  Gluc: 97 mg/dL / Ketone: x  / Bili: x / Urobili: x   Blood: x / Protein: x / Nitrite: x   Leuk Esterase: x / RBC: x / WBC x   Sq Epi: x / Non Sq Epi: x / Bacteria: x          Tele Reviewed:    RADIOLOGY & ADDITIONAL TESTS:  Results Reviewed:   Imaging Personally Reviewed:  Electrocardiogram Personally Reviewed:

## 2024-04-25 NOTE — PROGRESS NOTE ADULT - PROBLEM SELECTOR PROBLEM 5
Prophylactic measure
Preoperative examination
Prophylactic measure
Preoperative examination

## 2024-04-25 NOTE — PROGRESS NOTE ADULT - NUTRITIONAL ASSESSMENT
This patient has been assessed with a concern for Malnutrition and has been determined to have a diagnosis/diagnoses of Severe protein-calorie malnutrition.    This patient is being managed with:   Diet DASH/TLC-  Sodium & Cholesterol Restricted  Entered: Apr 16 2024 10:27PM  

## 2024-04-25 NOTE — PROGRESS NOTE ADULT - PROBLEM SELECTOR PROBLEM 1
Lymphedema
Osteomyelitis of foot
Lymphedema
Osteomyelitis of foot
Lymphedema
Lymphedema

## 2024-04-25 NOTE — PROGRESS NOTE ADULT - PROBLEM SELECTOR PLAN 4
- Stable   - C/w metoprolol  - Hold lasix give mild hypoNa  - Trend labs
- Denies coagulopathy  - No prior reaction to anesthesia  - Non smoker   -  Unable to perform > 4mets (w/c bound, has HHA)   - RCRI: 0  - EKG sinus   - cbc mild anemia & cmp largely unremarkable    - patient is low risk for low risk procedure,  pt is medically optimized provided   labs remain stable w/no clinical decompensation
- Stable   - C/w metoprolol  - Hold lasix give mild hypoNa  - Trend labs

## 2024-04-25 NOTE — PROGRESS NOTE ADULT - PROBLEM SELECTOR PROBLEM 6
Medication management
Medication management
Prophylactic measure
Prophylactic measure
Medication management

## 2024-04-25 NOTE — PROGRESS NOTE ADULT - REASON FOR ADMISSION
Wound Check

## 2024-04-25 NOTE — DISCHARGE NOTE NURSING/CASE MANAGEMENT/SOCIAL WORK - PATIENT PORTAL LINK FT
You can access the FollowMyHealth Patient Portal offered by [facility] by registering at the following website: [email or web address]. By joining [facility]’s FollowMyHealth portal, you will also be able to view your health information using other applications (apps) compatible with our system.

## 2024-04-25 NOTE — PROGRESS NOTE ADULT - PROBLEM SELECTOR PLAN 3
Hg  9.8  (bL unclear, no prior in chart )   - Ddx: AoCD vs NORAH   - NORAH; oral iron
- Stable   - C/w metoprolol  - Hold lasix give mild hypoNa  - Trend labs
Hg  9.8  (bL unclear, no prior in chart )   - Ddx: AoCD vs NORAH   - NORAH; oral iron
- Stable   - C/w metoprolol  - Hold lasix give mild hypoNa  - Trend labs

## 2024-04-25 NOTE — PROGRESS NOTE ADULT - PROVIDER SPECIALTY LIST ADULT
Internal Medicine
Podiatry
Podiatry
Internal Medicine
Internal Medicine
Vascular Cardiology
Internal Medicine
Podiatry
Podiatry
Internal Medicine

## 2024-04-25 NOTE — DISCHARGE NOTE NURSING/CASE MANAGEMENT/SOCIAL WORK - NSDCPEFALRISK_GEN_ALL_CORE
For information on Fall & Injury Prevention, visit: [email or web address] OR  [email or web address] OR  https://www.cdc.gov/steadi/patient.html

## 2024-04-26 VITALS
OXYGEN SATURATION: 100 % | TEMPERATURE: 98 F | DIASTOLIC BLOOD PRESSURE: 86 MMHG | SYSTOLIC BLOOD PRESSURE: 125 MMHG | HEART RATE: 56 BPM | RESPIRATION RATE: 19 BRPM

## 2024-04-26 PROCEDURE — 87641 MR-STAPH DNA AMP PROBE: CPT

## 2024-04-26 PROCEDURE — 80048 BASIC METABOLIC PNL TOTAL CA: CPT

## 2024-04-26 PROCEDURE — 83605 ASSAY OF LACTIC ACID: CPT

## 2024-04-26 PROCEDURE — 85025 COMPLETE CBC W/AUTO DIFF WBC: CPT

## 2024-04-26 PROCEDURE — 99285 EMERGENCY DEPT VISIT HI MDM: CPT

## 2024-04-26 PROCEDURE — 86140 C-REACTIVE PROTEIN: CPT

## 2024-04-26 PROCEDURE — 84132 ASSAY OF SERUM POTASSIUM: CPT

## 2024-04-26 PROCEDURE — 84484 ASSAY OF TROPONIN QUANT: CPT

## 2024-04-26 PROCEDURE — 84295 ASSAY OF SERUM SODIUM: CPT

## 2024-04-26 PROCEDURE — 97162 PT EVAL MOD COMPLEX 30 MIN: CPT

## 2024-04-26 PROCEDURE — 83036 HEMOGLOBIN GLYCOSYLATED A1C: CPT

## 2024-04-26 PROCEDURE — 80061 LIPID PANEL: CPT

## 2024-04-26 PROCEDURE — 85014 HEMATOCRIT: CPT

## 2024-04-26 PROCEDURE — 93306 TTE W/DOPPLER COMPLETE: CPT

## 2024-04-26 PROCEDURE — 82746 ASSAY OF FOLIC ACID SERUM: CPT

## 2024-04-26 PROCEDURE — 84100 ASSAY OF PHOSPHORUS: CPT

## 2024-04-26 PROCEDURE — 82728 ASSAY OF FERRITIN: CPT

## 2024-04-26 PROCEDURE — 96375 TX/PRO/DX INJ NEW DRUG ADDON: CPT

## 2024-04-26 PROCEDURE — 87205 SMEAR GRAM STAIN: CPT

## 2024-04-26 PROCEDURE — 82435 ASSAY OF BLOOD CHLORIDE: CPT

## 2024-04-26 PROCEDURE — 85652 RBC SED RATE AUTOMATED: CPT

## 2024-04-26 PROCEDURE — 93923 UPR/LXTR ART STDY 3+ LVLS: CPT

## 2024-04-26 PROCEDURE — 87077 CULTURE AEROBIC IDENTIFY: CPT

## 2024-04-26 PROCEDURE — 82330 ASSAY OF CALCIUM: CPT

## 2024-04-26 PROCEDURE — 96374 THER/PROPH/DIAG INJ IV PUSH: CPT

## 2024-04-26 PROCEDURE — 73610 X-RAY EXAM OF ANKLE: CPT

## 2024-04-26 PROCEDURE — 86850 RBC ANTIBODY SCREEN: CPT

## 2024-04-26 PROCEDURE — 82607 VITAMIN B-12: CPT

## 2024-04-26 PROCEDURE — 85027 COMPLETE CBC AUTOMATED: CPT

## 2024-04-26 PROCEDURE — A9585: CPT

## 2024-04-26 PROCEDURE — 86900 BLOOD TYPING SEROLOGIC ABO: CPT

## 2024-04-26 PROCEDURE — 87640 STAPH A DNA AMP PROBE: CPT

## 2024-04-26 PROCEDURE — 87040 BLOOD CULTURE FOR BACTERIA: CPT

## 2024-04-26 PROCEDURE — 83540 ASSAY OF IRON: CPT

## 2024-04-26 PROCEDURE — 82803 BLOOD GASES ANY COMBINATION: CPT

## 2024-04-26 PROCEDURE — 87070 CULTURE OTHR SPECIMN AEROBIC: CPT

## 2024-04-26 PROCEDURE — 87186 SC STD MICRODIL/AGAR DIL: CPT

## 2024-04-26 PROCEDURE — 73620 X-RAY EXAM OF FOOT: CPT

## 2024-04-26 PROCEDURE — 76376 3D RENDER W/INTRP POSTPROCES: CPT

## 2024-04-26 PROCEDURE — 85610 PROTHROMBIN TIME: CPT

## 2024-04-26 PROCEDURE — 80053 COMPREHEN METABOLIC PANEL: CPT

## 2024-04-26 PROCEDURE — 85730 THROMBOPLASTIN TIME PARTIAL: CPT

## 2024-04-26 PROCEDURE — 85018 HEMOGLOBIN: CPT

## 2024-04-26 PROCEDURE — 73630 X-RAY EXAM OF FOOT: CPT

## 2024-04-26 PROCEDURE — 86901 BLOOD TYPING SEROLOGIC RH(D): CPT

## 2024-04-26 PROCEDURE — 83735 ASSAY OF MAGNESIUM: CPT

## 2024-04-26 PROCEDURE — 73720 MRI LWR EXTREMITY W/O&W/DYE: CPT | Mod: MC

## 2024-04-26 PROCEDURE — 82947 ASSAY GLUCOSE BLOOD QUANT: CPT

## 2024-04-26 PROCEDURE — 36415 COLL VENOUS BLD VENIPUNCTURE: CPT

## 2024-04-26 PROCEDURE — 83550 IRON BINDING TEST: CPT

## 2024-04-26 PROCEDURE — 83880 ASSAY OF NATRIURETIC PEPTIDE: CPT

## 2024-04-26 RX ORDER — SENNA PLUS 8.6 MG/1
1 TABLET ORAL
Qty: 5 | Refills: 0
Start: 2024-04-26 | End: 2024-04-30

## 2024-04-26 RX ORDER — PANTOPRAZOLE SODIUM 20 MG/1
0 TABLET, DELAYED RELEASE ORAL
Refills: 0 | DISCHARGE

## 2024-04-26 RX ORDER — OXYCODONE HYDROCHLORIDE 5 MG/1
1 TABLET ORAL
Qty: 20 | Refills: 0
Start: 2024-04-26 | End: 2024-04-30

## 2024-04-26 RX ORDER — FUROSEMIDE 40 MG
0 TABLET ORAL
Refills: 0 | DISCHARGE

## 2024-04-26 RX ORDER — GABAPENTIN 400 MG/1
1 CAPSULE ORAL
Qty: 90 | Refills: 0
Start: 2024-04-26 | End: 2024-05-25

## 2024-04-26 RX ORDER — METOPROLOL TARTRATE 50 MG
0.5 TABLET ORAL
Qty: 30 | Refills: 0
Start: 2024-04-26 | End: 2024-05-25

## 2024-04-26 RX ORDER — GABAPENTIN 400 MG/1
0 CAPSULE ORAL
Refills: 0 | DISCHARGE

## 2024-04-26 RX ORDER — MIDODRINE HYDROCHLORIDE 2.5 MG/1
0 TABLET ORAL
Refills: 0 | DISCHARGE

## 2024-04-26 RX ORDER — METOPROLOL TARTRATE 50 MG
0 TABLET ORAL
Refills: 0 | DISCHARGE

## 2024-04-26 RX ORDER — POTASSIUM CHLORIDE 20 MEQ
0 PACKET (EA) ORAL
Refills: 0 | DISCHARGE

## 2024-04-26 RX ADMIN — OXYCODONE HYDROCHLORIDE 5 MILLIGRAM(S): 5 TABLET ORAL at 06:18

## 2024-04-26 RX ADMIN — PANTOPRAZOLE SODIUM 40 MILLIGRAM(S): 20 TABLET, DELAYED RELEASE ORAL at 05:06

## 2024-04-26 RX ADMIN — OXYCODONE HYDROCHLORIDE 5 MILLIGRAM(S): 5 TABLET ORAL at 06:48

## 2024-04-26 RX ADMIN — GABAPENTIN 300 MILLIGRAM(S): 400 CAPSULE ORAL at 05:06

## 2024-04-26 RX ADMIN — HEPARIN SODIUM 5000 UNIT(S): 5000 INJECTION INTRAVENOUS; SUBCUTANEOUS at 05:07

## 2024-05-09 PROBLEM — E78.5 HYPERLIPIDEMIA, UNSPECIFIED: Chronic | Status: ACTIVE | Noted: 2024-04-16

## 2024-05-09 PROBLEM — I10 ESSENTIAL (PRIMARY) HYPERTENSION: Chronic | Status: ACTIVE | Noted: 2024-04-16

## 2024-05-09 PROBLEM — Z00.00 ENCOUNTER FOR PREVENTIVE HEALTH EXAMINATION: Status: ACTIVE | Noted: 2024-05-09

## 2024-05-09 PROBLEM — I89.0 LYMPHEDEMA, NOT ELSEWHERE CLASSIFIED: Chronic | Status: ACTIVE | Noted: 2024-04-16

## 2024-05-13 ENCOUNTER — APPOINTMENT (OUTPATIENT)
Dept: CARE COORDINATION | Facility: HOME HEALTH | Age: 84
End: 2024-05-13
Payer: MEDICARE

## 2024-05-13 DIAGNOSIS — L97.409 NON-PRESSURE CHRONIC ULCER OF UNSPECIFIED HEEL AND MIDFOOT WITH UNSPECIFIED SEVERITY: ICD-10-CM

## 2024-05-13 DIAGNOSIS — Z74.1 NEED FOR ASSISTANCE WITH PERSONAL CARE: ICD-10-CM

## 2024-05-13 DIAGNOSIS — K59.09 OTHER CONSTIPATION: ICD-10-CM

## 2024-05-13 DIAGNOSIS — I10 ESSENTIAL (PRIMARY) HYPERTENSION: ICD-10-CM

## 2024-05-13 DIAGNOSIS — R52 PAIN, UNSPECIFIED: ICD-10-CM

## 2024-05-13 DIAGNOSIS — Z60.2 PROBLEMS RELATED TO LIVING ALONE: ICD-10-CM

## 2024-05-13 DIAGNOSIS — M86.9 OSTEOMYELITIS, UNSPECIFIED: ICD-10-CM

## 2024-05-13 DIAGNOSIS — Z87.891 PERSONAL HISTORY OF NICOTINE DEPENDENCE: ICD-10-CM

## 2024-05-13 PROCEDURE — 99349 HOME/RES VST EST MOD MDM 40: CPT

## 2024-05-13 RX ORDER — SENNA 8.6 MG/1
8.6 TABLET, FILM COATED ORAL
Qty: 60 | Refills: 0 | Status: ACTIVE | COMMUNITY
Start: 2024-05-13

## 2024-05-13 RX ORDER — GABAPENTIN 300 MG/1
300 CAPSULE ORAL
Qty: 90 | Refills: 0 | Status: ACTIVE | COMMUNITY
Start: 2024-05-13

## 2024-05-13 RX ORDER — METOPROLOL TARTRATE 25 MG/1
25 TABLET, FILM COATED ORAL
Qty: 60 | Refills: 0 | Status: ACTIVE | COMMUNITY
Start: 2024-05-13

## 2024-05-13 SDOH — SOCIAL STABILITY - SOCIAL INSECURITY: PROBLEMS RELATED TO LIVING ALONE: Z60.2

## 2024-05-14 PROBLEM — K59.09 CHRONIC CONSTIPATION: Status: ACTIVE | Noted: 2024-05-13

## 2024-05-14 PROBLEM — L97.409 HEEL ULCER: Status: ACTIVE | Noted: 2024-05-14

## 2024-05-14 PROBLEM — R52 PERSISTENT WOUND PAIN: Status: ACTIVE | Noted: 2024-05-13

## 2024-05-14 PROBLEM — I10 HTN (HYPERTENSION): Status: ACTIVE | Noted: 2024-05-13

## 2024-05-14 PROBLEM — M86.9 OSTEOMYELITIS: Status: ACTIVE | Noted: 2024-05-14

## 2024-05-14 NOTE — HISTORY OF PRESENT ILLNESS
[Home] : at home, [unfilled] , at the time of the visit. [Other Location: e.g. Home (Enter Location, City,State)___] : at [unfilled] [Other:____] : [unfilled] [Verbal consent obtained from patient] : the patient, [unfilled] [Other: _____] : [unfilled] [FreeTextEntry1] : F/u post hospitalization at [facility] from 4/16-26 for foot ulcer [de-identified] : This patient is Enrolled in the Post-Discharge [facility] Follow Up Program through [facility] for Continuity of Care S/P Recent Hospitalization until seen by PCP. Brief Hospital Course copied: 83y M pmh htn, chronic LE lymphedema presents for eval of foot wound (b/l heel wounds to bone).  He states for one week he has had worsening pain with bleeding throughout the sole of the right foot. Was seen by wound care Podiatrist who sent pt to ED for further eval/ tx of leg wound. recently treated with IV antibiotics for 6 weeks Pt is wheelchair bound, lives at home, has full time aides to help him. Televisit made with pt and his aide Danyell. Visit conducted  via audio and video as pt/HHA was having connection issues. Pt is alert and oriented x3. C/o not sleeping for past 3 weeks, decreased appetite. Had disagreement with RN so wound care was not done today. Pt states he did not get any pain meds on dc home. Requested the names of his meds, his local pharmacy name and phone number to send the meds. State he would need to call me back. Number given. Instructed pt that if he is still not feeling well, he might need to go back to the hospital.  Unable to see wounds as pt stated "give me your number and if you can't help me, I will hang up". Number given for him to call back about the missing medications.  TCM NP reviewed that pt is under the [facility] program for home care until pt is seen by PCP. TCM NP will be assigned to sign Home Care orders post-discharge

## 2024-05-14 NOTE — PLAN
[FreeTextEntry1] : 1. continue all medications as prescribed 2. f/u with PCP 3. cont wound care 4. Maintain DASH diet 5. keep f/u with wound clinic 6.  maintain fall precaution 7. Call PCP/TCM for any questions or concerns.

## 2024-05-14 NOTE — REVIEW OF SYSTEMS
[Fatigue] : fatigue [Unsteady Walk] : ataxia [Negative] : Heme/Lymph [Fever] : no fever [Chills] : no chills [Vision Problems] : no vision problems [Chest Pain] : no chest pain [Lower Ext Edema] : no lower extremity edema [Shortness Of Breath] : no shortness of breath [Cough] : no cough [Abdominal Pain] : no abdominal pain [Constipation] : no constipation [Incontinence] : no incontinence [Muscle Pain] : no muscle pain [Muscle Weakness] : no muscle weakness [Skin Rash] : no skin rash [Dizziness] : no dizziness [Memory Loss] : no memory loss [Insomnia] : no insomnia

## 2024-05-14 NOTE — PHYSICAL EXAM
[No Acute Distress] : no acute distress [No Respiratory Distress] : no respiratory distress  [No Accessory Muscle Use] : no accessory muscle use [No Edema] : there was no peripheral edema [Normal] : affect was normal and insight and judgment were intact [de-identified] : glasses [de-identified] : heel ulcer [de-identified] : wheelchair

## 2024-05-14 NOTE — ASSESSMENT
[FreeTextEntry1] : #Osteomyelitis - involving the calcaneus and the proximal fifth metatarsal, adjacent to soft tissue wounds -f/u with PCP/Wound clinic for f/u MRI -offload heels to decrease pressure #Heel Ulcer - apply Santyl, followed by Aqaucel to both heels, followed by 4x4 gauze, and ABD pad, and Fartun -load the heel at ALL times with off-loading shoes while not ambulating -RN for wound care -f/u with wound clinic -f/u with PCP #HTN -cont Metoprolol Tartrate -cont Low salt diet -f/u with PCP -maintain adequate fluid intake #Chronic Constipation -cont Senna -maintain adequate hydration and nutrition -f/u with PCP #Persistent wound pain -cont Oxycodone as prescribed -cont Gabapentin -cont bowel regimen -ff/u with PCP/wound specialist
Detail Level: Detailed
Depth Of Biopsy: dermis
Was A Bandage Applied: Yes
Size Of Lesion In Cm: 0
Biopsy Type: H and E
Biopsy Method: double edge Personna blade
Anesthesia Type: 1% lidocaine with epinephrine and a 1:10 solution of 8.4% sodium bicarbonate
Anesthesia Volume In Cc (Will Not Render If 0): 1
Hemostasis: Aluminum Chloride
Wound Care: Polysporin ointment
Dressing: dry sterile dressing
Destruction After The Procedure: No
Type Of Destruction Used: Curettage
Curettage Text: The wound bed was treated with curettage after the biopsy was performed.
Cryotherapy Text: The wound bed was treated with cryotherapy after the biopsy was performed.
Electrodesiccation Text: The wound bed was treated with electrodesiccation after the biopsy was performed.
Electrodesiccation And Curettage Text: The wound bed was treated with electrodesiccation and curettage after the biopsy was performed.
Silver Nitrate Text: The wound bed was treated with silver nitrate after the biopsy was performed.
Lab: 6
Consent: Written consent was obtained and risks were reviewed including but not limited to scarring, infection, bleeding, scabbing, incomplete removal, nerve damage and allergy to anesthesia.
Post-Care Instructions: I reviewed with the patient in detail post-care instructions. Patient is to keep the biopsy site dry overnight, and then apply bacitracin twice daily until healed. Patient may apply hydrogen peroxide soaks to remove any crusting.
Notification Instructions: Patient will be notified of biopsy results. However, patient instructed to call the office if not contacted within 2 weeks.
Billing Type: Third-Party Bill
Information: Selecting Yes will display possible errors in your note based on the variables you have selected. This validation is only offered as a suggestion for you. PLEASE NOTE THAT THE VALIDATION TEXT WILL BE REMOVED WHEN YOU FINALIZE YOUR NOTE. IF YOU WANT TO FAX A PRELIMINARY NOTE YOU WILL NEED TO TOGGLE THIS TO 'NO' IF YOU DO NOT WANT IT IN YOUR FAXED NOTE.